# Patient Record
Sex: MALE
[De-identification: names, ages, dates, MRNs, and addresses within clinical notes are randomized per-mention and may not be internally consistent; named-entity substitution may affect disease eponyms.]

---

## 2022-10-18 ENCOUNTER — NON-APPOINTMENT (OUTPATIENT)
Age: 72
End: 2022-10-18

## 2023-02-13 PROBLEM — Z00.00 ENCOUNTER FOR PREVENTIVE HEALTH EXAMINATION: Status: ACTIVE | Noted: 2023-02-13

## 2023-02-14 ENCOUNTER — APPOINTMENT (OUTPATIENT)
Dept: ORTHOPEDIC SURGERY | Facility: CLINIC | Age: 73
End: 2023-02-14

## 2023-04-28 ENCOUNTER — APPOINTMENT (OUTPATIENT)
Dept: CARDIOLOGY | Facility: CLINIC | Age: 73
End: 2023-04-28
Payer: MEDICAID

## 2023-04-28 ENCOUNTER — NON-APPOINTMENT (OUTPATIENT)
Age: 73
End: 2023-04-28

## 2023-04-28 VITALS
RESPIRATION RATE: 16 BRPM | HEART RATE: 81 BPM | DIASTOLIC BLOOD PRESSURE: 52 MMHG | BODY MASS INDEX: 22.66 KG/M2 | WEIGHT: 141 LBS | OXYGEN SATURATION: 94 % | HEIGHT: 66 IN | SYSTOLIC BLOOD PRESSURE: 98 MMHG | TEMPERATURE: 98.9 F

## 2023-04-28 VITALS — DIASTOLIC BLOOD PRESSURE: 54 MMHG | SYSTOLIC BLOOD PRESSURE: 98 MMHG

## 2023-04-28 DIAGNOSIS — Z83.3 FAMILY HISTORY OF DIABETES MELLITUS: ICD-10-CM

## 2023-04-28 DIAGNOSIS — Z78.9 OTHER SPECIFIED HEALTH STATUS: ICD-10-CM

## 2023-04-28 DIAGNOSIS — Z82.49 FAMILY HISTORY OF ISCHEMIC HEART DISEASE AND OTHER DISEASES OF THE CIRCULATORY SYSTEM: ICD-10-CM

## 2023-04-28 DIAGNOSIS — E11.9 TYPE 2 DIABETES MELLITUS W/OUT COMPLICATIONS: ICD-10-CM

## 2023-04-28 DIAGNOSIS — Z87.828 PERSONAL HISTORY OF OTHER (HEALED) PHYSICAL INJURY AND TRAUMA: ICD-10-CM

## 2023-04-28 PROCEDURE — 99204 OFFICE O/P NEW MOD 45 MIN: CPT | Mod: 25

## 2023-04-28 PROCEDURE — 93000 ELECTROCARDIOGRAM COMPLETE: CPT

## 2023-04-28 RX ORDER — DAPAGLIFLOZIN 5 MG/1
5 TABLET, FILM COATED ORAL DAILY
Qty: 30 | Refills: 2 | Status: ACTIVE | COMMUNITY
Start: 2023-04-28

## 2023-04-28 NOTE — PHYSICAL EXAM
[Well Developed] : well developed [No Acute Distress] : no acute distress [Well Nourished] : well nourished [Normal Conjunctiva] : normal conjunctiva [Normal Venous Pressure] : normal venous pressure [No Carotid Bruit] : no carotid bruit [Normal S1, S2] : normal S1, S2 [No Murmur] : no murmur [No Rub] : no rub [No Gallop] : no gallop [Clear Lung Fields] : clear lung fields [Good Air Entry] : good air entry [No Respiratory Distress] : no respiratory distress  [Non Tender] : non-tender [Soft] : abdomen soft [No Masses/organomegaly] : no masses/organomegaly [Normal Bowel Sounds] : normal bowel sounds [Normal Gait] : normal gait [No Edema] : no edema [No Cyanosis] : no cyanosis [No Clubbing] : no clubbing [No Varicosities] : no varicosities [No Rash] : no rash [No Skin Lesions] : no skin lesions [Moves all extremities] : moves all extremities [No Focal Deficits] : no focal deficits [Normal Speech] : normal speech [Alert and Oriented] : alert and oriented [Normal memory] : normal memory

## 2023-04-28 NOTE — DISCUSSION/SUMMARY
[FreeTextEntry1] : 73M Mandarin-speaking h/o thalassemia -trait, HTN, DM2, intermittent L-sided chest discomfort, presents for cardiology evaluation. \par \par Nonexertional chest discomfort and EKG no ischemic abnormalities, overall risk factors are controlled, but given intermediate risk for CAD will obtain cardiac CTA and Echocardiogram. \par \par \par 1. Chest discomfort- await Echo and CCTA, premed with metoprolol 50mg x2 doses, recent Cr. normal, advised to hold losartan and metformin prior to the CCTA. \par \par 2. HTN- BP at goal on losartan 50mg, may need dose reduction to 25mg if SBP <100 persistently. \par \par 3. DM2- on oral agents recent A1c at goal, not on statin with lipid panel at goal, if CCTA with calcifications to add low dose atorvastatin 20mg. \par \par \par Follow up pending result of the above tests.  [EKG obtained to assist in diagnosis and management of assessed problem(s)] : EKG obtained to assist in diagnosis and management of assessed problem(s)

## 2023-04-28 NOTE — HISTORY OF PRESENT ILLNESS
[FreeTextEntry1] : 73M Mandarin-speaking h/o thalassemia -trait, HTN, DM2, intermittent L-sided chest discomfort, presents for cardiology evaluation. \par \par Patient presents with his wife/daughter for the visit. 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 Recollect had prior exercise stress test >20yrs ago. \par \par \par PCP Dr. Pollo Wilder [phone]; fax [phone]\par Recent lab: A1c 6.6%, LDL 70, Cr. 0.67\par \par Nonsmoker, no alcohol use\par No CAD or stroke in family\par Lives in [place], ordinally from [facility] \par

## 2023-05-02 ENCOUNTER — TRANSCRIPTION ENCOUNTER (OUTPATIENT)
Age: 73
End: 2023-05-02

## 2023-05-03 ENCOUNTER — APPOINTMENT (OUTPATIENT)
Dept: UROLOGY | Facility: CLINIC | Age: 73
End: 2023-05-03
Payer: MEDICAID

## 2023-05-03 VITALS
SYSTOLIC BLOOD PRESSURE: 136 MMHG | DIASTOLIC BLOOD PRESSURE: 75 MMHG | RESPIRATION RATE: 16 BRPM | HEIGHT: 66 IN | WEIGHT: 142 LBS | TEMPERATURE: 98.2 F | HEART RATE: 77 BPM | BODY MASS INDEX: 22.82 KG/M2

## 2023-05-03 DIAGNOSIS — N40.0 BENIGN PROSTATIC HYPERPLASIA WITHOUT LOWER URINARY TRACT SYMPMS: ICD-10-CM

## 2023-05-03 PROCEDURE — 51798 US URINE CAPACITY MEASURE: CPT

## 2023-05-03 PROCEDURE — 99204 OFFICE O/P NEW MOD 45 MIN: CPT

## 2023-05-03 RX ORDER — TAMSULOSIN HYDROCHLORIDE 0.4 MG/1
0.4 CAPSULE ORAL
Qty: 90 | Refills: 3 | Status: DISCONTINUED | COMMUNITY
Start: 2023-05-03 | End: 2023-05-03

## 2023-05-04 LAB
ANION GAP SERPL CALC-SCNC: 12 MMOL/L
APPEARANCE: CLEAR
BACTERIA: NEGATIVE /HPF
BILIRUBIN URINE: NEGATIVE
BLOOD URINE: NEGATIVE
BUN SERPL-MCNC: 31 MG/DL
CALCIUM SERPL-MCNC: 9.9 MG/DL
CAST: 0 /LPF
CHLORIDE SERPL-SCNC: 102 MMOL/L
CO2 SERPL-SCNC: 26 MMOL/L
COLOR: YELLOW
CREAT SERPL-MCNC: 0.87 MG/DL
EGFR: 92 ML/MIN/1.73M2
EPITHELIAL CELLS: 0 /HPF
GLUCOSE QUALITATIVE U: >=1000 MG/DL
GLUCOSE SERPL-MCNC: 152 MG/DL
KETONES URINE: NEGATIVE MG/DL
LEUKOCYTE ESTERASE URINE: NEGATIVE
MICROSCOPIC-UA: NORMAL
NITRITE URINE: NEGATIVE
PH URINE: 5.5
POTASSIUM SERPL-SCNC: 5.2 MMOL/L
PROTEIN URINE: NEGATIVE MG/DL
RED BLOOD CELLS URINE: 0 /HPF
SODIUM SERPL-SCNC: 139 MMOL/L
SPECIFIC GRAVITY URINE: 1.03
UROBILINOGEN URINE: 0.2 MG/DL
WHITE BLOOD CELLS URINE: 0 /HPF

## 2023-05-04 NOTE — LETTER BODY
[FreeTextEntry1] : Renae Wilder MD\par [address]\par [address]\par [place] [address] \par [phone]\par [phone]\par \par Dear Dr. Wilder,\par \par Reason for Visit: BPH.\par \par This is a 73 year old gentleman with symptoms of BPH. Patient is here today for evaluation. Patient reports he has weak uroflow, frequency, and hesitancy. He denies any hematuria or urinary incontinence. His symptoms are aggravated by hydration. He denies any alleviating factors. He has not tried any medical therapy previously. He reports no pain. All other review of systems are negative. Past medical history, family history, and social history were inquired and were noncontributory to patient current condition. Medications and allergies were reviewed. He recently return from [place] and is accompanied his wife. \par \par On examination, the patient is a well appearing gentleman in no acute distress. He is alert and oriented follows commands. He has normal mood and affect. He is normocephalic. Neck is supple. Oral no thrush Respirations are unlabored. His abdomen is soft and nontender. Bladder is nonpalpable. No CVA tenderness. Neurologically he is grossly intact. No peripheral edema. Skin without gross abnormality. He has normal male external genitalia. Normal meatus. Bilateral testes are descended intrascrotally and normal to palpation. On rectal examination, there is normal sphincter tone. The prostate is clinically benign without focal induration or nodularity.\par \par Post-void residual on bladder scan today was 138 cc.\par \par ASSESSMENT: BPH\par \par I counseled the patient on the various etiology of his symptoms. I discussed the natural history of BPH and the treatment options available. I discussed the options of conservative management with fluid in dietary restrictions, herbal therapy, medical therapy, and minimally invasive procedures.  Risk and benefits were discussed. I answered his questions. I recommended he try Flomax. I discussed the potential side effects of the medication. I counseled the patient on its use and side effects. If the patient develops any side effects, the patient will discontinue the medication and contact me. Risks and alternatives were discussed. I answered the patient questions. The patient will follow-up as directed and will contact me with any questions or concerns. \par \par Plan: Trial of Flomax. Followup 1 month. PSA 
no hemoptysis/no pleuritic chest pain/no cough/no wheezing/no dyspnea

## 2023-05-05 LAB
BACTERIA UR CULT: NORMAL
PSA FREE FLD-MCNC: 23 %
PSA FREE SERPL-MCNC: 0.69 NG/ML
PSA SERPL-MCNC: 2.95 NG/ML

## 2023-05-18 ENCOUNTER — APPOINTMENT (OUTPATIENT)
Dept: CARDIOLOGY | Facility: CLINIC | Age: 73
End: 2023-05-18
Payer: MEDICAID

## 2023-05-18 PROCEDURE — 93306 TTE W/DOPPLER COMPLETE: CPT

## 2023-05-24 ENCOUNTER — APPOINTMENT (OUTPATIENT)
Dept: CT IMAGING | Facility: CLINIC | Age: 73
End: 2023-05-24
Payer: MEDICAID

## 2023-05-24 ENCOUNTER — OUTPATIENT (OUTPATIENT)
Dept: OUTPATIENT SERVICES | Facility: HOSPITAL | Age: 73
LOS: 1 days | End: 2023-05-24
Payer: MEDICAID

## 2023-05-24 DIAGNOSIS — I10 ESSENTIAL (PRIMARY) HYPERTENSION: ICD-10-CM

## 2023-05-24 DIAGNOSIS — Z00.00 ENCOUNTER FOR GENERAL ADULT MEDICAL EXAMINATION WITHOUT ABNORMAL FINDINGS: ICD-10-CM

## 2023-05-24 DIAGNOSIS — R07.89 OTHER CHEST PAIN: ICD-10-CM

## 2023-05-24 PROCEDURE — 75574 CT ANGIO HRT W/3D IMAGE: CPT | Mod: 26

## 2023-05-26 ENCOUNTER — RX CHANGE (OUTPATIENT)
Age: 73
End: 2023-05-26

## 2023-05-26 DIAGNOSIS — R93.1 ABNORMAL FINDINGS ON DIAGNOSTIC IMAGING OF HEART AND CORONARY CIRCULATION: ICD-10-CM

## 2023-05-29 ENCOUNTER — RESULT REVIEW (OUTPATIENT)
Age: 73
End: 2023-05-29

## 2023-05-30 PROCEDURE — 0502T: CPT

## 2023-05-30 PROCEDURE — 0503T: CPT

## 2023-05-30 PROCEDURE — 75574 CT ANGIO HRT W/3D IMAGE: CPT

## 2023-05-30 PROCEDURE — 0504T: CPT

## 2023-06-05 ENCOUNTER — APPOINTMENT (OUTPATIENT)
Dept: UROLOGY | Facility: CLINIC | Age: 73
End: 2023-06-05
Payer: MEDICAID

## 2023-06-05 PROCEDURE — 51798 US URINE CAPACITY MEASURE: CPT

## 2023-06-05 PROCEDURE — 99214 OFFICE O/P EST MOD 30 MIN: CPT

## 2023-06-05 NOTE — LETTER BODY
[FreeTextEntry1] : Renae Wilder MD\par [address]\par [address]\par [place] [address] \par [phone]\par [phone]\par \par Dear Dr. Wilder,\par \par \par Reason for visit: BPH. \par \par This is a 73 year-old gentleman with chronic BPH. Patient returns today for followup. Patient continues to take Flomax daily. Patient reports taking the medication regularly without any side effects or difficulties with the medication. Patient reports progression in his urinary symptoms with weak flow and frequency. Patient denies any urinary retention or hematuria or changes in health. Patient has no pain. The past medical history and family history and social history are unchanged. All other review of systems are negative. Patient denies any changes in medications. Medication list was reconciled.\par \par On examination, the patient is a healthy-appearing gentleman in no acute distress. He is alert and oriented follows commands. He has normal mood and affect. He is normocephalic. Oral no thrush. Neck is supple. Respirations are unlabored. His abdomen is soft and nontender. Liver is nonpalpable. Bladder is nonpalpable. No CVA tenderness. Neurologically he is grossly intact. No peripheral edema. Skin without gross abnormality. \par \par PVR today was 40 cc.  This is improved from previous evaluation.\par \par His recent PSA was unremarkable.\par \par Assessment: BPH, with progressive urinary symptoms despite medical therapy.\par \par I counseled the patient.  I renewed his prescription for Flomax and Proscar today. I encouraged him to increase Flomax to twice daily.  If he has persistent symptoms, I recommended he proceed with cystoscopy and urodynamic testing to evaluate detrusor function and bladder outlet obstruction.  I counseled the patient regarding the procedure. The risks and benefits were discussed. Alternatives were given. I answered the patient questions. The patient will take the necessary preparations for the procedure. The patient will follow-up as directed and will contact me with any questions or concerns.\par \par Plan: Flomax twice daily.  Consider cystoscopy.  Urodynamic testing.

## 2023-06-06 ENCOUNTER — NON-APPOINTMENT (OUTPATIENT)
Age: 73
End: 2023-06-06

## 2023-07-10 ENCOUNTER — APPOINTMENT (OUTPATIENT)
Dept: GASTROENTEROLOGY | Facility: CLINIC | Age: 73
End: 2023-07-10
Payer: MEDICAID

## 2023-07-10 ENCOUNTER — APPOINTMENT (OUTPATIENT)
Dept: UROLOGY | Facility: CLINIC | Age: 73
End: 2023-07-10
Payer: MEDICAID

## 2023-07-10 VITALS
HEIGHT: 66 IN | BODY MASS INDEX: 22.5 KG/M2 | DIASTOLIC BLOOD PRESSURE: 62 MMHG | SYSTOLIC BLOOD PRESSURE: 120 MMHG | TEMPERATURE: 98.7 F | OXYGEN SATURATION: 97 % | HEART RATE: 78 BPM | WEIGHT: 140 LBS

## 2023-07-10 DIAGNOSIS — K21.9 GASTRO-ESOPHAGEAL REFLUX DISEASE W/OUT ESOPHAGITIS: ICD-10-CM

## 2023-07-10 DIAGNOSIS — Z80.0 FAMILY HISTORY OF MALIGNANT NEOPLASM OF DIGESTIVE ORGANS: ICD-10-CM

## 2023-07-10 DIAGNOSIS — Z91.89 OTHER SPECIFIED PERSONAL RISK FACTORS, NOT ELSEWHERE CLASSIFIED: ICD-10-CM

## 2023-07-10 PROCEDURE — 99213 OFFICE O/P EST LOW 20 MIN: CPT

## 2023-07-10 PROCEDURE — 99204 OFFICE O/P NEW MOD 45 MIN: CPT

## 2023-07-10 NOTE — REASON FOR VISIT
[Initial Evaluation] : an initial evaluation [Spouse] : spouse [Family Member] : family member [FreeTextEntry1] : Colon cancer screening, new coughing/reflux

## 2023-07-10 NOTE — PHYSICAL EXAM
[Alert] : alert [Normal Voice/Communication] : normal voice/communication [Healthy Appearing] : healthy appearing [Sclera] : the sclera and conjunctiva were normal [Hearing Threshold Finger Rub Not [place]] : hearing was normal [No Respiratory Distress] : no respiratory distress [No Acc Muscle Use] : no accessory muscle use [Respiration, Rhythm And Depth] : normal respiratory rhythm and effort [Heart Rate And Rhythm] : heart rate was normal and rhythm regular [Normal S1, S2] : normal S1 and S2 [Murmurs] : no murmurs [Bowel Sounds] : normal bowel sounds [Abdomen Tenderness] : non-tender [No Masses] : no abdominal mass palpated [Abdomen Soft] : soft [No Spinal Tenderness] : no spinal tenderness [Abnormal Walk] : normal gait [Normal Color / Pigmentation] : normal skin color and pigmentation [No Focal Deficits] : no focal deficits [Oriented To Time, Place, And Person] : oriented to person, place, and time

## 2023-07-10 NOTE — HISTORY OF PRESENT ILLNESS
[FreeTextEntry1] : Patient interviewed in native Mandarin Chinese. \par \par This is a 73 year old male with thalassemia trait, HTN, DM2, who presents for evaluation of high risk colon cancer screening, new reflux.\par \par The patient has seen cardiology, and is pending cardiac cath for abnormal coronary later this month. He's been having coughing,and wa given omeprazole which improved the coughing. The coughing is only new and he attributes it to reflux, he never had reflux before. He only started the omperazole in the last 1-2 weeks. No dysphagia. No abdominal pain. No nausea/vomiting. Stools usually are formed, no melena/hematochezia. No abnormal weight loss. His father had stomach and colon cancer at in his 70's. He has never had endoscopic evaluation or colon cancer screening before.\par \par TTE 5/18/23:\par CONCLUSIONS:\par \par 1. The left ventricular systolic function is normal with an ejection fraction of 63 % by Calabrese's method of disks with an ejection fraction visually estimated at 60 to 65 %.\par 2. Impaired relaxation pattern.\par 3. Normal right ventricular cavity size and normal systolic function, estimated PASP 22 mmHg.\par 4. Redundant and thickened hypermobile septal leaflet of the tricuspid valve.\par 5. The aortic root at sinuses of Valsalva is moderately dilated 4.6 cm index to BSA 2.7 cm/m2. Ascending aorta 3.9 cm.\par 6. No pericardial effusion seen.\par 7. No prior echocardiogram is available for comparison.\par \par \par 5/3/23: Na 139, K 5.2, Cl 102, bicarb 26, BUN 31, Cr 0.87, glucose 152, Ca 9.9

## 2023-07-10 NOTE — LETTER BODY
[FreeTextEntry1] : Renae Wilder MD\par [address]\par [address]\par [place] [address] \par [phone]\par [phone]\par \par Dear Dr. Wilder,\par  \par Reason for visit: BPH. \par \par This is a 73 year year-old gentleman with chronic BPH. Patient returns today for followup. Patient continues to take Flomax twice daily. Patient reports taking the medication regularly without any side effects or difficulties with the medication. Patient reports improvement in his urinary flow. Patient denies any urinary retention or hematuria or changes in health. Patient has no pain. The past medical history and family history and social history are unchanged. All other review of systems are negative. Patient denies any changes in medications. Medication list was reconciled.\par \par On examination, the patient is a healthy-appearing gentleman in no acute distress. He is alert and oriented follows commands. He has normal mood and affect. He is normocephalic. Oral no thrush. Neck is supple. Respirations are unlabored. His abdomen is soft and nontender. Liver is nonpalpable. Bladder is nonpalpable. No CVA tenderness. Neurologically he is grossly intact. No peripheral edema. Skin without gross abnormality.  On rectal examination, there is normal sphincter tone. The prostate is clinically benign without focal induration or nodularity.\par \par PSA 2.95.\par \par Assessment: BPH, symptoms improved with Flomax twice daily.\par \par I counseled the patient.  I renewed his prescription for Flomax today.  Patient obtain PSA today.  I encouraged him to continue medication. Risks and alternatives were discussed. I answered the patient questions. The patient will follow-up as directed and will contact me with any questions or concerns.  Thank you for the opportunity to participate in the care of this patient. I'll keep you updated on his  progress.\par \par Plan: Followup 1 year. Continue Flomax twice daily.  PSA.

## 2023-07-10 NOTE — ASSESSMENT
[FreeTextEntry1] : Impression:\par 1. High risk for colon cancer given 1st degree relative (father) with colon cancer\par 2. New coughing - ?new reflux\par \par Plan:\par -Given new reflux in older patient, would pursue EGD to rule out underlying lesion\par -Advised colonoscopy for screening as he's overdue; explained that in patinets with 1st degree relative with colon cancer, the screening interval is for q5 years\par -Risks and benefits of upper endoscopy and colonoscopy including but not limited to bleeding, infection, missed lesions, perforation, injury to internal organs, anesthesia/drug side effects were discussed with patient. All questions answered.  \par -However, since patient had chest pain and is pending cardiac cath, we will await cardiac workup is completed before endoscopic evaluation as he has no bleeding/urgent indications to proceed before cath; will d/w patient's cardiologist\par -Advised lifestyle modifications for reflux. Asked patient to avoid eating 3 hours before going to bed or laying down, and to elevate the head of his bed.\par -Can continue PPI for now as he is having benefit\par -Askd patient to call me back after his cardiac cath to see what is required during the cath to determine the next steps

## 2023-07-10 NOTE — CONSULT LETTER
[Dear  ___] : Dear  [unfilled], [Consult Letter:] : I had the pleasure of evaluating your patient, [unfilled]. [Please see my note below.] : Please see my note below. [Consult Closing:] : Thank you very much for allowing me to participate in the care of this patient.  If you have any questions, please do not hesitate to contact me. [Sincerely,] : Sincerely, [FreeTextEntry2] : Dr. Renae Wilder [FreeTextEntry3] : Vance Carlisle MD\par [facility]\par  of Medicine, [facility] [employer] at [facility]\par Tel: [phone]\par Fax: [phone]\par

## 2023-07-29 RX ORDER — CHLORHEXIDINE GLUCONATE 213 G/1000ML
1 SOLUTION TOPICAL ONCE
Refills: 0 | Status: DISCONTINUED | OUTPATIENT
Start: 2023-07-31 | End: 2023-08-01

## 2023-07-29 NOTE — H&P PST ADULT - HISTORY OF PRESENT ILLNESS
Narrative:   72 yo male h/o thalassemia-trait, HTN, DM2 with c/o intermittent left sided chest discomfort. Reports chest discomfort as pressure and been going on for a few years, but worse lately occuring randomly and no associated symptoms. He recently had an abnormal cardiac CTA, with multivessal CAD with severe coronary calcifications in the LAD and severe stenosis of OM, given symptoms of chest pain presents for elective LHC to R/O CAD.       Symptoms:        Angina (Class):        Ischemic Symptoms:     Heart Failure:        Systolic/Diastolic/Combined:        NYHA Class (within 2 weeks):     Assessment of LVEF:       EF:        Assessed by:        Date:     Prior Cardiac Interventions: N/A       PCI's:        CABG:     Noninvasive Testing:   Stress Test: Date:        Protocol:        Duration of Exercise:        Symptoms:        EKG Changes:        DTS:        Myocardial Imaging:        Risk Assessment:     Echo:     Antianginal Therapies:        Beta Blockers:         Calcium Channel Blockers:        Long Acting Nitrates:        Ranexa:     Associated Risk Factors:        Cerebrovascular Disease: N/A       Chronic Lung Disease: N/A       Peripheral Arterial Disease: N/A       Chronic Kidney Disease (if yes, what is GFR): N/A       Uncontrolled Diabetes (if yes, what is HgbA1C or FBS): N/A       Poorly Controlled Hypertension (if yes, what is SBP): N/A       Morbid Obesity (if yes, what is BMI): N/A       History of Recent Ventricular Arrhythmia: N/A       Inability to Ambulate Safely: N/A       Need for Therapeutic Anticoagulation: N/A       Antiplatelet or Contrast Allergy: N/A Narrative:   74 yo male h/o thalassemia-trait, HTN, DM2 with c/o intermittent left sided chest discomfort. Reports chest discomfort as pressure and been going on for a few years, but worse lately occuring randomly and no associated symptoms. He recently had an abnormal cardiac CTA, with multivessal CAD with severe coronary calcifications in the LAD and severe stenosis of OM, given symptoms of chest pain presents for elective LHC to R/O CAD.       Symptoms:        Angina (Class):        Ischemic Symptoms:     Heart Failure:        Systolic/Diastolic/Combined:        NYHA Class (within 2 weeks):     Assessment of LVEF:       EF: 60-65%       Assessed by: TTE       Date: 5/18/2023    Prior Cardiac Interventions: N/A       PCI's:        CABG:     Noninvasive Testing:   Cardiac CTA: 5/2023: Severe stenosis of proximal OM1 branch. Moderate stenosis of the proximal right coronary and proximal circumflex arteries.     Echo: 5/2023: EF 60-65% Redundant and thickened hypermobile septal leaflet of the tricuspid valve. The aortic root at sinuses of valsalva is moderately dilated 4.6cm index to BSA 2.7cm. Ascending aorta 3.9cm    Antianginal Therapies:        Beta Blockers:  Metoprolol 50mg        Calcium Channel Blockers:        Long Acting Nitrates:        Ranexa:     Associated Risk Factors:        Cerebrovascular Disease: N/A       Chronic Lung Disease: N/A       Peripheral Arterial Disease: N/A       Chronic Kidney Disease (if yes, what is GFR): N/A       Uncontrolled Diabetes (if yes, what is HgbA1C or FBS): N/A       Poorly Controlled Hypertension (if yes, what is SBP): N/A       Morbid Obesity (if yes, what is BMI): N/A       History of Recent Ventricular Arrhythmia: N/A       Inability to Ambulate Safely: N/A       Need for Therapeutic Anticoagulation: N/A       Antiplatelet or Contrast Allergy: N/A 72 yo male h/o thalassemia-trait, HTN, DM2 with c/o intermittent left sided chest discomfort. Reports chest discomfort as pressure and been going on for a few years, but worse lately occuring randomly and no associated symptoms. He recently had an abnormal cardiac CTA, with multivessal CAD with severe coronary calcifications in the LAD and severe stenosis of OM, given symptoms of chest pain presents for elective LHC to R/O CAD.       Symptoms:        Angina (Class):        Ischemic Symptoms:     Heart Failure:        Systolic/Diastolic/Combined:        NYHA Class (within 2 weeks):     Assessment of LVEF:       EF: 60-65%       Assessed by: TTE       Date: 5/18/2023    Prior Cardiac Interventions: N/A       PCI's: N/A       CABG: N/A    Noninvasive Testing:   Cardiac CTA Calcium Score:   CORONARY: Right coronary artery dominance. No evidence for anomalous coronary arteries. (Total: 3544)  LEFT MAIN: Trifurcation into LAD, ramus intermedius, and LCx. Minimal (less than 15%) stenosis of the proximal and distal left main artery due to calcified plaque. (Calcium Score: 29, CT-FFR: )  RAMUS INTERMEDIUS: Small patent vessel.  LEFT ANTERIOR DESCENDING: There is one diagonal branch. Distal vessel wraps around apex. (Calcium Score: 535, CT-FFR: 0.88)  ·	Proximal: Mild stenosis due to calcified and noncalcified plaque.  ·	Mid: Mild stenosis due to calcified and noncalcified plaque. Short segment shallow myocardial bridging.  ·	Distal: Minimal stenosis due to noncalcified plaque.  ·	First diagonal: Large vessel with extensive dense calcification, which precludes evaluation for stenosis severity. Severe stenosis is not excluded. The vessel is patent distally (CT-FFR: 0.54).  LEFT CIRCUMFLEX: There are two obtuse marginal branches. (Calcium Score: 676, CT-FFR: 0.94 just distal to the OM --->0.8 ---> 0.57)  ·	Proximal: Moderate stenosis due to extensive calcified and noncalcified plaque.   ·	Mid/Distal: Extensive dense calcification, precluding evaluation for stenosis severity. Severe stenosis is not excluded.  ·	First obtuse marginal: Proximal severe stenosis due to calcified and noncalcified plaque. Vessels patent distally (CT-FFR: 0.89)  ·	Second obtuse marginal: Small patent vessel.  RIGHT CORONARY ARTERY: Gives off PDA, PLV branches. (Calcium Score: 2304, CT-FFR: Proximal: 0.97, Mid: 0.82, Distal: 0.88)  ·	Proximal: Moderate stenosis due to extensive, predominantly calcified plaque  ·	Mid: Mild stenosis due to predominantly calcified plaque.  ·	Distal: Moderate stenosis due to extensive calcified and noncalcified plaque.  ·	Posterior descending: Patent vessel; scattered areas of calcified plaque without significant stenosis.  ·	Posterior lateral: Patent vessel; scattered areas of calcified plaque without significant stenosis.    Echo:     Echo: 5/2023: EF 60-65% Redundant and thickened hypermobile septal leaflet of the tricuspid valve. The aortic root at sinuses of valsalva is moderately dilated 4.6cm index to BSA 2.7cm. Ascending aorta 3.9cm    Antianginal Therapies:        Beta Blockers:  Metoprolol 50mg        Calcium Channel Blockers:        Long Acting Nitrates:        Ranexa:     Associated Risk Factors:        Cerebrovascular Disease: N/A       Chronic Lung Disease: N/A       Peripheral Arterial Disease: N/A       Chronic Kidney Disease (if yes, what is GFR): N/A       Uncontrolled Diabetes (if yes, what is HgbA1C or FBS): N/A       Poorly Controlled Hypertension (if yes, what is SBP): N/A       Morbid Obesity (if yes, what is BMI): N/A       History of Recent Ventricular Arrhythmia: N/A       Inability to Ambulate Safely: N/A       Need for Therapeutic Anticoagulation: N/A       Antiplatelet or Contrast Allergy: N/A

## 2023-07-29 NOTE — H&P PST ADULT - ASSESSMENT
72 yo male with c/o chest pain for LHC    Plan/Recommendations:   -plan for LHC  -preferred access: RRA vs RFA  -patient seen and examined  -confirmed appropriate NPO duration  -ECG and Labs reviewed  -Aspirin 81mg po pre-cath  -Plavix 75 mg given  -NS 250mL IV bolus pre-cath  -procedure discussed with patient; risks and benefits explained, questions answered  -consent obtained by attending IC   72 yo male with c/o chest pain for LHC    Plan/Recommendations:   -plan for LHC  -preferred access: RRA vs RFA  -patient seen and examined  -confirmed appropriate NPO duration  -ECG and Labs reviewed  -Aspirin 81mg po pre-cath  -NS 250mL IV bolus pre-cath  -procedure discussed with patient; risks and benefits explained, questions answered  -consent obtained by attending IC

## 2023-07-29 NOTE — H&P PST ADULT - OTHER CARE PROVIDERS
Dr. Thompson Drew Thompson ([facility] at [place], [address], [address], [place] [address], Phone: [phone], Fax: [phone])

## 2023-07-29 NOTE — H&P PST ADULT - PRIMARY CARE PROVIDER
Dr. Pollo Wilder Pollo Wilder Pollo Wilder ([address], [place] [address], Phone: [phone], Fax:

## 2023-07-31 ENCOUNTER — INPATIENT (INPATIENT)
Facility: HOSPITAL | Age: 73
LOS: 0 days | Discharge: HOME | DRG: 247 | End: 2023-08-01
Attending: INTERNAL MEDICINE | Admitting: INTERNAL MEDICINE
Payer: COMMERCIAL

## 2023-07-31 ENCOUNTER — TRANSCRIPTION ENCOUNTER (OUTPATIENT)
Age: 73
End: 2023-07-31

## 2023-07-31 VITALS
WEIGHT: 143.96 LBS | OXYGEN SATURATION: 98 % | RESPIRATION RATE: 14 BRPM | SYSTOLIC BLOOD PRESSURE: 129 MMHG | TEMPERATURE: 98 F | HEIGHT: 66 IN | HEART RATE: 67 BPM | DIASTOLIC BLOOD PRESSURE: 79 MMHG

## 2023-07-31 DIAGNOSIS — I10 ESSENTIAL (PRIMARY) HYPERTENSION: ICD-10-CM

## 2023-07-31 LAB
A1C WITH ESTIMATED AVERAGE GLUCOSE RESULT: 7.4 % — HIGH (ref 4–5.6)
AGGLUTINATION: PRESENT — SIGNIFICANT CHANGE UP
ANION GAP SERPL CALC-SCNC: 9 MMOL/L — SIGNIFICANT CHANGE UP (ref 5–17)
ANISOCYTOSIS BLD QL: SLIGHT — SIGNIFICANT CHANGE UP
BASOPHILS # BLD AUTO: 0 K/UL — SIGNIFICANT CHANGE UP (ref 0–0.2)
BASOPHILS NFR BLD AUTO: 0 % — SIGNIFICANT CHANGE UP (ref 0–2)
BUN SERPL-MCNC: 22.5 MG/DL — HIGH (ref 8–20)
BURR CELLS BLD QL SMEAR: PRESENT — SIGNIFICANT CHANGE UP
CALCIUM SERPL-MCNC: 8.9 MG/DL — SIGNIFICANT CHANGE UP (ref 8.4–10.5)
CHLORIDE SERPL-SCNC: 98 MMOL/L — SIGNIFICANT CHANGE UP (ref 96–108)
CHOLEST SERPL-MCNC: 93 MG/DL — SIGNIFICANT CHANGE UP
CO2 SERPL-SCNC: 28 MMOL/L — SIGNIFICANT CHANGE UP (ref 22–29)
CREAT SERPL-MCNC: 0.67 MG/DL — SIGNIFICANT CHANGE UP (ref 0.5–1.3)
EGFR: 99 ML/MIN/1.73M2 — SIGNIFICANT CHANGE UP
ELLIPTOCYTES BLD QL SMEAR: SLIGHT — SIGNIFICANT CHANGE UP
EOSINOPHIL # BLD AUTO: 0.42 K/UL — SIGNIFICANT CHANGE UP (ref 0–0.5)
EOSINOPHIL NFR BLD AUTO: 6 % — SIGNIFICANT CHANGE UP (ref 0–6)
ESTIMATED AVERAGE GLUCOSE: 166 MG/DL — HIGH (ref 68–114)
GIANT PLATELETS BLD QL SMEAR: PRESENT — SIGNIFICANT CHANGE UP
GLUCOSE BLDC GLUCOMTR-MCNC: 127 MG/DL — HIGH (ref 70–99)
GLUCOSE SERPL-MCNC: 141 MG/DL — HIGH (ref 70–99)
HCT VFR BLD CALC: 48.3 % — SIGNIFICANT CHANGE UP (ref 39–50)
HDLC SERPL-MCNC: 53 MG/DL — SIGNIFICANT CHANGE UP
HGB BLD-MCNC: 15 G/DL — SIGNIFICANT CHANGE UP (ref 13–17)
LIPID PNL WITH DIRECT LDL SERPL: 23 MG/DL — SIGNIFICANT CHANGE UP
LYMPHOCYTES # BLD AUTO: 0.97 K/UL — LOW (ref 1–3.3)
LYMPHOCYTES # BLD AUTO: 13.8 % — SIGNIFICANT CHANGE UP (ref 13–44)
MAGNESIUM SERPL-MCNC: 2.3 MG/DL — SIGNIFICANT CHANGE UP (ref 1.6–2.6)
MANUAL SMEAR VERIFICATION: SIGNIFICANT CHANGE UP
MCHC RBC-ENTMCNC: 21.2 PG — LOW (ref 27–34)
MCHC RBC-ENTMCNC: 31.1 GM/DL — LOW (ref 32–36)
MCV RBC AUTO: 68.3 FL — LOW (ref 80–100)
MONOCYTES # BLD AUTO: 0.42 K/UL — SIGNIFICANT CHANGE UP (ref 0–0.9)
MONOCYTES NFR BLD AUTO: 6 % — SIGNIFICANT CHANGE UP (ref 2–14)
NEUTROPHILS # BLD AUTO: 3.71 K/UL — SIGNIFICANT CHANGE UP (ref 1.8–7.4)
NEUTROPHILS NFR BLD AUTO: 52.6 % — SIGNIFICANT CHANGE UP (ref 43–77)
NON HDL CHOLESTEROL: 40 MG/DL — SIGNIFICANT CHANGE UP
OVALOCYTES BLD QL SMEAR: SLIGHT — SIGNIFICANT CHANGE UP
PLAT MORPH BLD: ABNORMAL
PLATELET # BLD AUTO: 243 K/UL — SIGNIFICANT CHANGE UP (ref 150–400)
POIKILOCYTOSIS BLD QL AUTO: SLIGHT — SIGNIFICANT CHANGE UP
POLYCHROMASIA BLD QL SMEAR: SLIGHT — SIGNIFICANT CHANGE UP
POTASSIUM SERPL-MCNC: 3.9 MMOL/L — SIGNIFICANT CHANGE UP (ref 3.5–5.3)
POTASSIUM SERPL-SCNC: 3.9 MMOL/L — SIGNIFICANT CHANGE UP (ref 3.5–5.3)
RBC # BLD: 7.07 M/UL — HIGH (ref 4.2–5.8)
RBC # FLD: 18.6 % — HIGH (ref 10.3–14.5)
RBC BLD AUTO: NORMAL — SIGNIFICANT CHANGE UP
SMUDGE CELLS # BLD: PRESENT — SIGNIFICANT CHANGE UP
SODIUM SERPL-SCNC: 135 MMOL/L — SIGNIFICANT CHANGE UP (ref 135–145)
TRIGL SERPL-MCNC: 86 MG/DL — SIGNIFICANT CHANGE UP
VARIANT LYMPHS # BLD: 21.6 % — HIGH (ref 0–6)
WBC # BLD: 7.05 K/UL — SIGNIFICANT CHANGE UP (ref 3.8–10.5)
WBC # FLD AUTO: 7.05 K/UL — SIGNIFICANT CHANGE UP (ref 3.8–10.5)

## 2023-07-31 PROCEDURE — 93458 L HRT ARTERY/VENTRICLE ANGIO: CPT | Mod: 26,XU

## 2023-07-31 PROCEDURE — 93010 ELECTROCARDIOGRAM REPORT: CPT

## 2023-07-31 PROCEDURE — 99223 1ST HOSP IP/OBS HIGH 75: CPT | Mod: 25

## 2023-07-31 PROCEDURE — 92928 PRQ TCAT PLMT NTRAC ST 1 LES: CPT | Mod: LC

## 2023-07-31 PROCEDURE — 99152 MOD SED SAME PHYS/QHP 5/>YRS: CPT

## 2023-07-31 RX ORDER — CLOPIDOGREL BISULFATE 75 MG/1
600 TABLET, FILM COATED ORAL ONCE
Refills: 0 | Status: COMPLETED | OUTPATIENT
Start: 2023-07-31 | End: 2023-07-31

## 2023-07-31 RX ORDER — GLUCAGON INJECTION, SOLUTION 0.5 MG/.1ML
1 INJECTION, SOLUTION SUBCUTANEOUS ONCE
Refills: 0 | Status: DISCONTINUED | OUTPATIENT
Start: 2023-07-31 | End: 2023-08-01

## 2023-07-31 RX ORDER — SODIUM CHLORIDE 9 MG/ML
250 INJECTION INTRAMUSCULAR; INTRAVENOUS; SUBCUTANEOUS ONCE
Refills: 0 | Status: COMPLETED | OUTPATIENT
Start: 2023-07-31 | End: 2023-07-31

## 2023-07-31 RX ORDER — LOSARTAN POTASSIUM 100 MG/1
50 TABLET, FILM COATED ORAL DAILY
Refills: 0 | Status: DISCONTINUED | OUTPATIENT
Start: 2023-07-31 | End: 2023-08-01

## 2023-07-31 RX ORDER — DEXTROSE 50 % IN WATER 50 %
25 SYRINGE (ML) INTRAVENOUS ONCE
Refills: 0 | Status: DISCONTINUED | OUTPATIENT
Start: 2023-07-31 | End: 2023-08-01

## 2023-07-31 RX ORDER — CLOPIDOGREL BISULFATE 75 MG/1
75 TABLET, FILM COATED ORAL DAILY
Refills: 0 | Status: DISCONTINUED | OUTPATIENT
Start: 2023-08-01 | End: 2023-08-01

## 2023-07-31 RX ORDER — DEXTROSE 50 % IN WATER 50 %
15 SYRINGE (ML) INTRAVENOUS ONCE
Refills: 0 | Status: DISCONTINUED | OUTPATIENT
Start: 2023-07-31 | End: 2023-08-01

## 2023-07-31 RX ORDER — SODIUM CHLORIDE 9 MG/ML
1000 INJECTION, SOLUTION INTRAVENOUS
Refills: 0 | Status: DISCONTINUED | OUTPATIENT
Start: 2023-07-31 | End: 2023-08-01

## 2023-07-31 RX ORDER — TAMSULOSIN HYDROCHLORIDE 0.4 MG/1
0.4 CAPSULE ORAL
Refills: 0 | Status: DISCONTINUED | OUTPATIENT
Start: 2023-07-31 | End: 2023-08-01

## 2023-07-31 RX ORDER — ASPIRIN/CALCIUM CARB/MAGNESIUM 324 MG
81 TABLET ORAL DAILY
Refills: 0 | Status: DISCONTINUED | OUTPATIENT
Start: 2023-07-31 | End: 2023-08-01

## 2023-07-31 RX ORDER — DEXTROSE 50 % IN WATER 50 %
12.5 SYRINGE (ML) INTRAVENOUS ONCE
Refills: 0 | Status: DISCONTINUED | OUTPATIENT
Start: 2023-07-31 | End: 2023-08-01

## 2023-07-31 RX ORDER — ATORVASTATIN CALCIUM 80 MG/1
40 TABLET, FILM COATED ORAL AT BEDTIME
Refills: 0 | Status: DISCONTINUED | OUTPATIENT
Start: 2023-07-31 | End: 2023-08-01

## 2023-07-31 RX ORDER — INSULIN LISPRO 100/ML
VIAL (ML) SUBCUTANEOUS
Refills: 0 | Status: DISCONTINUED | OUTPATIENT
Start: 2023-07-31 | End: 2023-08-01

## 2023-07-31 RX ADMIN — CLOPIDOGREL BISULFATE 600 MILLIGRAM(S): 75 TABLET, FILM COATED ORAL at 17:58

## 2023-07-31 RX ADMIN — SODIUM CHLORIDE 250 MILLILITER(S): 9 INJECTION INTRAMUSCULAR; INTRAVENOUS; SUBCUTANEOUS at 16:07

## 2023-07-31 RX ADMIN — SODIUM CHLORIDE 250 MILLILITER(S): 9 INJECTION INTRAMUSCULAR; INTRAVENOUS; SUBCUTANEOUS at 14:07

## 2023-07-31 RX ADMIN — TAMSULOSIN HYDROCHLORIDE 0.4 MILLIGRAM(S): 0.4 CAPSULE ORAL at 17:57

## 2023-07-31 RX ADMIN — ATORVASTATIN CALCIUM 40 MILLIGRAM(S): 80 TABLET, FILM COATED ORAL at 21:30

## 2023-07-31 NOTE — DISCHARGE NOTE PROVIDER - NSDCMRMEDTOKEN_GEN_ALL_CORE_FT
aspirin 81 mg oral capsule: 1 orally 3 times a week  atorvastatin 40 mg oral tablet: 1 orally once a day  Farxiga 5 mg oral tablet: 1 orally once a day  losartan 50 mg oral tablet: 1 orally once a day  metFORMIN 1000 mg oral tablet: 1 orally once a day  tamsulosin 0.4 mg oral capsule: 1 orally 2 times a day   aspirin 81 mg oral tablet, chewable: 1 tab(s) orally once a day  atorvastatin 40 mg oral tablet: 1 orally once a day  clopidogrel 75 mg oral tablet: 1 tab(s) orally once a day  Farxiga 5 mg oral tablet: 1 orally once a day  losartan 50 mg oral tablet: 1 orally once a day  metFORMIN 1000 mg oral tablet: 1 tablet orally once a day RESTRT ON AUGUST 3, 2023  tamsulosin 0.4 mg oral capsule: 1 orally 2 times a day

## 2023-07-31 NOTE — DISCHARGE NOTE PROVIDER - PROVIDER TOKENS
PROVIDER:[TOKEN:[[id number]],SCHEDULEDAPPT:[08/04/2023],SCHEDULEDAPPTTIME:[08:30 AM],ESTABLISHEDPATIENT:[T]]

## 2023-07-31 NOTE — DISCHARGE NOTE PROVIDER - NSDCCPCAREPLAN_GEN_ALL_CORE_FT
PRINCIPAL DISCHARGE DIAGNOSIS  Diagnosis: Coronary artery disease of native artery of native heart with stable angina pectoris  Assessment and Plan of Treatment:   DAPT: The patient was loaded with Brilinta 180 mg in the CCL, will load with Plavix 600 mg tonight at 6:00PM, then start Plavix 75mg daily and Aspirin 81mg daily  Statin: Continue Lipitor 40 mg daily  Aggressive lifestyle modification and risk factor reduction.  Cardiac rehab info provided/referral and communication to cardiac rehab completed      SECONDARY DISCHARGE DIAGNOSES  Diagnosis: Hyperlipidemia, unspecified hyperlipidemia type  Assessment and Plan of Treatment:   LDL Goal: NonHDL < 80, LDL < 55  Lipid Panel: At goal  Statin: Continue Lipitor 40 mg daily  Other: N/A    Diagnosis: Type 2 diabetes mellitus with other circulatory complication, without long-term current use of insul  Assessment and Plan of Treatment:   GDMT:        Diabetic diet.       FS Q AC and HS with coverage       HgbA1C: 7.4%       Basal Insulin: N/A       Nutritional Insulin: N/A       Oral Antihyperglycemics: Will restart metformin 1000 mg daily on August 3, 2023.       SGLT2i/GLP1-RA: Will continue Farxiga 5 mg daily

## 2023-07-31 NOTE — DISCHARGE NOTE PROVIDER - HOSPITAL COURSE
72 yo male h/o thalassemia-trait, HTN, DM2 with c/o intermittent left sided chest discomfort. Reports chest discomfort as pressure and been going on for a few years, but worse lately occurring randomly and no associated symptoms. He recently had an abnormal cardiac CTA, with multivessal CAD with severe coronary calcifications in the LAD and severe stenosis of OM, given symptoms of chest pain presents for elective LHC to R/O CAD. He had a LHC which showed severe CAD of the mid and distal LCX which was treated with 2 MARA's, and moderate CAD of the entire RCA and pLCX. 72 yo male h/o thalassemia-trait, HTN, DM2 with c/o intermittent left sided chest discomfort. Reports chest discomfort as pressure and been going on for a few years, but worse lately occurring randomly and no associated symptoms. He recently had an abnormal cardiac CTA, with multivessal CAD with severe coronary calcifications in the LAD and severe stenosis of OM, given symptoms of chest pain presents for elective LHC to R/O CAD. He had a LHC which showed severe CAD of the mid and distal LCX which was treated with 2 MARA's, and moderate CAD of the entire RCA and pLCX.     ROS:   General: No fatigue  HEENT: No headache, no epistaxis.  CV: No chest pain, no palpitations.  Respiratory: No SOB, no cough, no wheeze  GI: No nausea    Objective:  Vital Signs Last 24 Hrs  T(C): 36.7 (01 Aug 2023 05:57), Max: 36.7 (01 Aug 2023 05:57)  T(F): 98 (01 Aug 2023 05:57), Max: 98 (01 Aug 2023 05:57)  HR: 68 (01 Aug 2023 08:55) (64 - 70)  BP: 100/60 (01 Aug 2023 08:55) (98/56 - 121/75)  RR: 16 (01 Aug 2023 08:55) (12 - 16)  SpO2: 100% (01 Aug 2023 08:55) (95% - 100%)    Parameters below as of 01 Aug 2023 05:57  Patient On (Oxygen Delivery Method): room air    CM: SR  Neuro: A&OX3, CN 2-12 intact  HEENT: NC, AT  Lungs: CTA B/L  CV: S1, S2, no murmur, RRR  Abd: Soft  Extremity: Right wrist: no bleeding, fingers warm with good cap refil

## 2023-07-31 NOTE — DISCHARGE NOTE PROVIDER - CARE PROVIDER_API CALL
Florencia Oneal NP in Adult Health  [address] [place] [id number]  Phone: [phone]  Fax: [phone]  Established Patient  Scheduled Appointment: 08/04/2023 08:30 AM

## 2023-07-31 NOTE — PROGRESS NOTE ADULT - SUBJECTIVE AND OBJECTIVE BOX
Department of Cardiology                                                                  [facility]/[place] [address] [facility]-[address]                                                            Telephone: [phone]. Fax:[phone]                                                        INTERVENTIONAL CARDIOLOGY CATHETERIZATION NOTE     Subjective:  73y  Male who had a left heart catheterization which showed:       LM: No significant CAD       LAD: No significant CAD       LCX: No significant CAD       RCA: No significant CAD         Access: Right radial artery       Hemostasis:        Total Contrast:        Total Heparin:        Antiplatelet Given:    PAST MEDICAL & SURGICAL HISTORY:  DM type 2, not at goal      HTN (hypertension)        FAMILY HISTORY:    Home Medications:  aspirin 81 mg oral capsule: 1 orally 3 times a week (31 Jul 2023 08:37)  atorvastatin 40 mg oral tablet: 1 orally once a day (31 Jul 2023 08:37)  Farxiga 5 mg oral tablet: 1 orally once a day (31 Jul 2023 08:37)  losartan 50 mg oral tablet: 1 orally once a day (31 Jul 2023 08:37)  metFORMIN 1000 mg oral tablet: 1 orally once a day (31 Jul 2023 08:37)  tamsulosin 0.4 mg oral capsule: 1 orally 2 times a day (31 Jul 2023 08:37)    Patient is a 73y old  Male who presents with a chief complaint of   HEALTH ISSUES - PROBLEM Dx:        HPI:  74 yo male h/o thalassemia-trait, HTN, DM2 with c/o intermittent left sided chest discomfort. Reports chest discomfort as pressure and been going on for a few years, but worse lately occuring randomly and no associated symptoms. He recently had an abnormal cardiac CTA, with multivessal CAD with severe coronary calcifications in the LAD and severe stenosis of OM, given symptoms of chest pain presents for elective LHC to R/O CAD.       Symptoms:        Angina (Class):        Ischemic Symptoms:     Heart Failure:        Systolic/Diastolic/Combined:        NYHA Class (within 2 weeks):     Assessment of LVEF:       EF: 60-65%       Assessed by: TTE       Date: 5/18/2023    Prior Cardiac Interventions: N/A       PCI's: N/A       CABG: N/A    Noninvasive Testing:   Cardiac CTA Calcium Score:   CORONARY: Right coronary artery dominance. No evidence for anomalous coronary arteries. (Total: 3544)  LEFT MAIN: Trifurcation into LAD, ramus intermedius, and LCx. Minimal (less than 15%) stenosis of the proximal and distal left main artery due to calcified plaque. (Calcium Score: 29, CT-FFR: )  RAMUS INTERMEDIUS: Small patent vessel.  LEFT ANTERIOR DESCENDING: There is one diagonal branch. Distal vessel wraps around apex. (Calcium Score: 535, CT-FFR: 0.88)  ·	Proximal: Mild stenosis due to calcified and noncalcified plaque.  ·	Mid: Mild stenosis due to calcified and noncalcified plaque. Short segment shallow myocardial bridging.  ·	Distal: Minimal stenosis due to noncalcified plaque.  ·	First diagonal: Large vessel with extensive dense calcification, which precludes evaluation for stenosis severity. Severe stenosis is not excluded. The vessel is patent distally (CT-FFR: 0.54).  LEFT CIRCUMFLEX: There are two obtuse marginal branches. (Calcium Score: 676, CT-FFR: 0.94 just distal to the OM --->0.8 ---> 0.57)  ·	Proximal: Moderate stenosis due to extensive calcified and noncalcified plaque.   ·	Mid/Distal: Extensive dense calcification, precluding evaluation for stenosis severity. Severe stenosis is not excluded.  ·	First obtuse marginal: Proximal severe stenosis due to calcified and noncalcified plaque. Vessels patent distally (CT-FFR: 0.89)  ·	Second obtuse marginal: Small patent vessel.  RIGHT CORONARY ARTERY: Gives off PDA, PLV branches. (Calcium Score: 2304, CT-FFR: Proximal: 0.97, Mid: 0.82, Distal: 0.88)  ·	Proximal: Moderate stenosis due to extensive, predominantly calcified plaque  ·	Mid: Mild stenosis due to predominantly calcified plaque.  ·	Distal: Moderate stenosis due to extensive calcified and noncalcified plaque.  ·	Posterior descending: Patent vessel; scattered areas of calcified plaque without significant stenosis.  ·	Posterior lateral: Patent vessel; scattered areas of calcified plaque without significant stenosis.    Echo:     Echo: 5/2023: EF 60-65% Redundant and thickened hypermobile septal leaflet of the tricuspid valve. The aortic root at sinuses of valsalva is moderately dilated 4.6cm index to BSA 2.7cm. Ascending aorta 3.9cm    Antianginal Therapies:        Beta Blockers:  Metoprolol 50mg        Calcium Channel Blockers:        Long Acting Nitrates:        Ranexa:     Associated Risk Factors:        Cerebrovascular Disease: N/A       Chronic Lung Disease: N/A       Peripheral Arterial Disease: N/A       Chronic Kidney Disease (if yes, what is GFR): N/A       Uncontrolled Diabetes (if yes, what is HgbA1C or FBS): N/A       Poorly Controlled Hypertension (if yes, what is SBP): N/A       Morbid Obesity (if yes, what is BMI): N/A       History of Recent Ventricular Arrhythmia: N/A       Inability to Ambulate Safely: N/A       Need for Therapeutic Anticoagulation: N/A       Antiplatelet or Contrast Allergy: N/A (29 Jul 2023 13:32)    General: No fatigue, no fevers/chills  Respiratory: No dyspnea, no cough, no wheeze  CV: No chest pain, no palpitations  Abd: No nausea  Neuro: No headache, no dizziness  Allergy Status Unknown      Objective:  Vital Signs Last 24 Hrs  T(C): 36.7 (31 Jul 2023 08:31), Max: 36.7 (31 Jul 2023 08:31)  T(F): 98 (31 Jul 2023 08:31), Max: 98 (31 Jul 2023 08:31)  HR: 69 (31 Jul 2023 12:55) (64 - 70)  BP: 110/69 (31 Jul 2023 12:55) (98/56 - 129/79)  BP(mean): 99 (31 Jul 2023 08:31) (99 - 99)  RR: 13 (31 Jul 2023 12:55) (13 - 16)  SpO2: 100% (31 Jul 2023 12:25) (95% - 100%)    Parameters below as of 31 Jul 2023 12:55  Patient On (Oxygen Delivery Method): room air        CM: SR  Neuro: A&OX3, CN 2-12 intact  HEENT: NC, AT  Lungs: CTA B/L  CV: S1, S2, no murmur, RRR  Abd: Soft  Extremity: Right radial band: no bleeding, fingers warm with good cap refil    EKG: NSR                          15.0   7.05  )-----------( 243      ( 31 Jul 2023 08:25 )             48.3     07-31    135  |  98    |  22.5  ----------------------------<  141  3.9   |  28.0  |  0.67    Ca    8.9      31 Jul 2023 08:25  Mg     2.3     07-31    Lipids       Total Cholesterol: 93       Triglycerides: 86       HDL: 53       Non-HDL: 40       LDL: 23    HgbA1C: 23                                                                            Department of Cardiology                                                                  [facility]/[place] [address]  [place]                                                            Telephone: [phone]. Fax:[phone]                                                        INTERVENTIONAL CARDIOLOGY CATHETERIZATION NOTE     Subjective:  73y  Male who had a left heart catheterization which showed:  Coronary Angiography   ·	The coronary circulation is right dominant.    LM   ·	Left main artery: Angiography shows minor irregularities.    LAD   ·	Left anterior descending artery: LAD- mild diffuse disease. D1- 80-90% long area of ostial/proximal/mid disease. .  CX   Circumflex: Proximal 30-40%, Mid 80% (treated with SYNERGY XD 2.5 X 28MM MARA), Distal 75%. Heavily calcified disease (treated with SYNERGY XD 2.5 X 16MM MARA).  RCA   ·	Right coronary artery: Heavily calcified vessel. Diffuse 20-30% proximal, mid 40-50%, Distal 30-40%. .  Left Heart Cath   ·	LHC performed: Aortic valve crossed and left ventricular pressures were obtained. Hemodynamic assessment demonstrates mildly elevated LVEDP.         Access: Right radial artery       Hemostasis: Radial band       Total Contrast: 112 mL Omnipaque       Total Heparin: 7,000 units       Antiplatelet Given: Brilinta 180 mg    PAST MEDICAL & SURGICAL HISTORY:  DM type 2, not at goal  HTN (hypertension)    Home Medications:  aspirin 81 mg oral capsule: 1 orally 3 times a week (31 Jul 2023 08:37)  atorvastatin 40 mg oral tablet: 1 orally once a day (31 Jul 2023 08:37)  Farxiga 5 mg oral tablet: 1 orally once a day (31 Jul 2023 08:37)  losartan 50 mg oral tablet: 1 orally once a day (31 Jul 2023 08:37)  metFORMIN 1000 mg oral tablet: 1 orally once a day (31 Jul 2023 08:37)  tamsulosin 0.4 mg oral capsule: 1 orally 2 times a day (31 Jul 2023 08:37)    HPI: 72 yo male h/o thalassemia-trait, HTN, DM2 with c/o intermittent left sided chest discomfort. Reports chest discomfort as pressure and been going on for a few years, but worse lately occuring randomly and no associated symptoms. He recently had an abnormal cardiac CTA, with multivessal CAD with severe coronary calcifications in the LAD and severe stenosis of OM, given symptoms of chest pain presents for elective LHC to R/O CAD.     CM: SR  Neuro: A&OX3, CN 2-12 intact  HEENT: NC, AT  Lungs: CTA B/L  CV: S1, S2, no murmur, RRR  Abd: Soft  Extremity: Right radial band: no bleeding, fingers warm with good cap refil    EKG: NSR                          15.0   7.05  )-----------( 243      ( 31 Jul 2023 08:25 )             48.3     07-31    135  |  98    |  22.5  ----------------------------<  141  3.9   |  28.0  |  0.67    Ca    8.9      31 Jul 2023 08:25  Mg     2.3     07-31    Lipids       Total Cholesterol: 93       Triglycerides: 86       HDL: 53       Non-HDL: 40       LDL: 23    HgbA1C: 23

## 2023-07-31 NOTE — DISCHARGE NOTE PROVIDER - NSDCCPTREATMENT_GEN_ALL_CORE_FT
PRINCIPAL PROCEDURE  Procedure: Initial insertion of drug eluting stent into left circumflex coronary artery  Findings and Treatment:   Coronary Angiography   The coronary circulation is right dominant.    LM   Left main artery: Angiography shows minor irregularities.    LAD   Left anterior descending artery: LAD- mild diffuse disease. D1- 80-90% long area of ostial/proximal/mid disease. .  CX   Circumflex: Proximal 30-40%, Mid 80% (treated with SYNERGY XD 2.5 X 28MM MARA), Distal 75%. Heavily calcified disease (treated with SYNERGY XD 2.5 X 16MM MARA).  RCA   Right coronary artery: Heavily calcified vessel. Diffuse 20-30% proximal, mid 40-50%, Distal 30-40%. .  Left Heart Cath   LHC performed: Aortic valve crossed and left ventricular pressures were obtained. Hemodynamic assessment demonstrates mildly elevated LVEDP.

## 2023-07-31 NOTE — DISCHARGE NOTE PROVIDER - NSDCFUSCHEDAPPT_GEN_ALL_CORE_FT
CARDIOLOGY     PROGRESS  NOTE   ________________________________________________    CHIEF COMPLAINT:Patient is a 86y old  Male who presents with a chief complaint of edema (06 Dec 2020 06:48)  no complain.  	  REVIEW OF SYSTEMS:  CONSTITUTIONAL: No fever, weight loss, or fatigue  EYES: No eye pain, visual disturbances, or discharge  ENT:  No difficulty hearing, tinnitus, vertigo; No sinus or throat pain  NECK: No pain or stiffness  RESPIRATORY: No cough, wheezing, chills or hemoptysis; No Shortness of Breath  CARDIOVASCULAR: No chest pain, palpitations, passing out, dizziness, or leg swelling  GASTROINTESTINAL: No abdominal or epigastric pain. No nausea, vomiting, or hematemesis; No diarrhea or constipation. No melena or hematochezia.  GENITOURINARY: No dysuria, frequency, hematuria, or incontinence  NEUROLOGICAL: No headaches, memory loss, loss of strength, numbness, or tremors  SKIN: No itching, burning, rashes, or lesions   LYMPH Nodes: No enlarged glands  ENDOCRINE: No heat or cold intolerance; No hair loss  MUSCULOSKELETAL: No joint pain or swelling; No muscle, back, or extremity pain  PSYCHIATRIC: No depression, anxiety, mood swings, or difficulty sleeping  HEME/LYMPH: No easy bruising, or bleeding gums  ALLERGY AND IMMUNOLOGIC: No hives or eczema	    [ ] All others negative	  [ ] Unable to obtain    PHYSICAL EXAM:  T(C): 36.4 (12-06-20 @ 03:00), Max: 36.9 (12-05-20 @ 15:34)  HR: 55 (12-06-20 @ 03:00) (55 - 64)  BP: 117/57 (12-06-20 @ 03:00) (106/52 - 145/67)  RR: 18 (12-06-20 @ 03:00) (18 - 18)  SpO2: 97% (12-06-20 @ 03:00) (96% - 97%)  Wt(kg): --  I&O's Summary    05 Dec 2020 07:01  -  06 Dec 2020 07:00  --------------------------------------------------------  IN: 850 mL / OUT: 2025 mL / NET: -1175 mL        Appearance: Normal	  HEENT:   Normal oral mucosa, PERRL, EOMI	  Lymphatic: No lymphadenopathy  Cardiovascular: Normal S1 S2, No JVD, + murmurs, No edema  Respiratory: Lungs clear to auscultation	  Psychiatry: A & O x 3, Mood & affect appropriate  Gastrointestinal:  Soft, Non-tender, + BS	  Skin: No rashes, No ecchymoses, No cyanosis	  Neurologic: Non-focal  Extremities: Normal range of motion, No clubbing, cyanosis or edema  Vascular: Peripheral pulses palpable 2+ bilaterally    MEDICATIONS  (STANDING):  aMIOdarone    Tablet 200 milliGRAM(s) Oral daily  apixaban 5 milliGRAM(s) Oral two times a day  aspirin enteric coated 81 milliGRAM(s) Oral daily  chlorhexidine 2% Cloths 1 Application(s) Topical <User Schedule>  furosemide    Tablet 40 milliGRAM(s) Oral daily  metoprolol tartrate 12.5 milliGRAM(s) Oral two times a day  pantoprazole    Tablet 40 milliGRAM(s) Oral before breakfast  polyethylene glycol 3350 17 Gram(s) Oral daily      TELEMETRY: 	    ECG:  	  RADIOLOGY:  OTHER: 	  	  LABS:	 	    CARDIAC MARKERS:                                11.0   13.15 )-----------( 331      ( 06 Dec 2020 06:27 )             32.9     12-06    129<L>  |  95<L>  |  20  ----------------------------<  83  5.1   |  23  |  0.78    Ca    9.3      06 Dec 2020 06:27  Phos  3.8     12-06  Mg     2.2     12-06    TPro  6.4  /  Alb  3.5  /  TBili  0.8  /  DBili  0.3<H>  /  AST  40  /  ALT  173<H>  /  AlkPhos  97  12-05    proBNP: Serum Pro-Brain Natriuretic Peptide: 73027 pg/mL (11-18 @ 12:45)    Lipid Profile: Cholesterol 106  LDL --  HDL 40  TG 73    HgA1c:   TSH: Thyroid Stimulating Hormone, Serum: 1.69 uIU/mL (11-30 @ 11:01)  Thyroid Stimulating Hormone, Serum: 1.46 uIU/mL (11-19 @ 09:24)  Thyroid Stimulating Hormone, Serum: 1.51 uIU/mL (11-19 @ 08:41)          Assessment and plan  ---------------------------  pt is an 85 y/o man with pmhx of htn, hld, hiatal hernia / GERD, OA post Lt TKR x1 year ago presenting with concern for increasing redness of left leg and swelling for 2 months.   Patient reports that he has been having increasing swelling of both of his legs for approximately 2-3 months; however, the left leg has been more swollen and has been getting increasingly more red. He states that he went to his podiatrist when he noted some rash on the lower portion of his left just above his foot, was given a 'cream of some kind, and feels that the redness and swelling got worse after starting the cream.'   He states that he was having chest pain approximately 1 mo ago, went to his PMD and was diagnosed with a hiatal hernia with an xray and prescribed omeprazole.   Patient reports that he has been becoming increasingly more short of breath for approximately 2 months as well. Feels that when he takes just a few steps he has to stop and catch his breath, and is so short of breath that he cannot speak in complete sentences without rstill in rapid a,fib  continue Amio  po loading  s/p hanna cardioversion, remains in NSR not pacing  fu lft/haley lft are improving  AC  will add small dose of metoprolol if tolerated  physical therapy  continue diuresis  sinus bradycardia, asymptomatic 50 to 60  increase ambulation  fu renal function if increase k will dc aldactone  hr is well controlled, no tachy or renée ,no complain  no indication for pacing  will fu as out pt        	         Florencia Oneal Physician [facility]  CARDIOLOGY [address]  Scheduled Appointment: 08/04/2023

## 2023-08-01 ENCOUNTER — TRANSCRIPTION ENCOUNTER (OUTPATIENT)
Age: 73
End: 2023-08-01

## 2023-08-01 VITALS
SYSTOLIC BLOOD PRESSURE: 100 MMHG | RESPIRATION RATE: 16 BRPM | HEART RATE: 68 BPM | DIASTOLIC BLOOD PRESSURE: 60 MMHG | OXYGEN SATURATION: 100 %

## 2023-08-01 LAB
ANION GAP SERPL CALC-SCNC: 13 MMOL/L — SIGNIFICANT CHANGE UP (ref 5–17)
BUN SERPL-MCNC: 16.4 MG/DL — SIGNIFICANT CHANGE UP (ref 8–20)
CALCIUM SERPL-MCNC: 8.4 MG/DL — SIGNIFICANT CHANGE UP (ref 8.4–10.5)
CHLORIDE SERPL-SCNC: 101 MMOL/L — SIGNIFICANT CHANGE UP (ref 96–108)
CO2 SERPL-SCNC: 21 MMOL/L — LOW (ref 22–29)
CREAT SERPL-MCNC: 0.65 MG/DL — SIGNIFICANT CHANGE UP (ref 0.5–1.3)
EGFR: 100 ML/MIN/1.73M2 — SIGNIFICANT CHANGE UP
GLUCOSE SERPL-MCNC: 150 MG/DL — HIGH (ref 70–99)
HCT VFR BLD CALC: 44.1 % — SIGNIFICANT CHANGE UP (ref 39–50)
HGB BLD-MCNC: 14 G/DL — SIGNIFICANT CHANGE UP (ref 13–17)
MAGNESIUM SERPL-MCNC: 2.3 MG/DL — SIGNIFICANT CHANGE UP (ref 1.6–2.6)
MCHC RBC-ENTMCNC: 21.2 PG — LOW (ref 27–34)
MCHC RBC-ENTMCNC: 31.7 GM/DL — LOW (ref 32–36)
MCV RBC AUTO: 66.7 FL — LOW (ref 80–100)
PLATELET # BLD AUTO: 229 K/UL — SIGNIFICANT CHANGE UP (ref 150–400)
POTASSIUM SERPL-MCNC: 4 MMOL/L — SIGNIFICANT CHANGE UP (ref 3.5–5.3)
POTASSIUM SERPL-SCNC: 4 MMOL/L — SIGNIFICANT CHANGE UP (ref 3.5–5.3)
RBC # BLD: 6.61 M/UL — HIGH (ref 4.2–5.8)
RBC # FLD: 18.3 % — HIGH (ref 10.3–14.5)
SODIUM SERPL-SCNC: 135 MMOL/L — SIGNIFICANT CHANGE UP (ref 135–145)
WBC # BLD: 7.56 K/UL — SIGNIFICANT CHANGE UP (ref 3.8–10.5)
WBC # FLD AUTO: 7.56 K/UL — SIGNIFICANT CHANGE UP (ref 3.8–10.5)

## 2023-08-01 PROCEDURE — 80048 BASIC METABOLIC PNL TOTAL CA: CPT

## 2023-08-01 PROCEDURE — C1769: CPT

## 2023-08-01 PROCEDURE — C9600: CPT | Mod: LC

## 2023-08-01 PROCEDURE — C1725: CPT

## 2023-08-01 PROCEDURE — 82962 GLUCOSE BLOOD TEST: CPT

## 2023-08-01 PROCEDURE — 85027 COMPLETE CBC AUTOMATED: CPT

## 2023-08-01 PROCEDURE — C1874: CPT

## 2023-08-01 PROCEDURE — 93010 ELECTROCARDIOGRAM REPORT: CPT

## 2023-08-01 PROCEDURE — 83735 ASSAY OF MAGNESIUM: CPT

## 2023-08-01 PROCEDURE — 93005 ELECTROCARDIOGRAM TRACING: CPT

## 2023-08-01 PROCEDURE — C1887: CPT

## 2023-08-01 PROCEDURE — 93458 L HRT ARTERY/VENTRICLE ANGIO: CPT | Mod: XU

## 2023-08-01 PROCEDURE — 83036 HEMOGLOBIN GLYCOSYLATED A1C: CPT

## 2023-08-01 PROCEDURE — 36415 COLL VENOUS BLD VENIPUNCTURE: CPT

## 2023-08-01 PROCEDURE — C1894: CPT

## 2023-08-01 PROCEDURE — 85025 COMPLETE CBC W/AUTO DIFF WBC: CPT

## 2023-08-01 PROCEDURE — 80061 LIPID PANEL: CPT

## 2023-08-01 RX ORDER — CLOPIDOGREL BISULFATE 75 MG/1
1 TABLET, FILM COATED ORAL
Qty: 90 | Refills: 3
Start: 2023-08-01 | End: 2024-07-25

## 2023-08-01 RX ORDER — ASPIRIN/CALCIUM CARB/MAGNESIUM 324 MG
1 TABLET ORAL
Refills: 0 | DISCHARGE

## 2023-08-01 RX ORDER — ASPIRIN/CALCIUM CARB/MAGNESIUM 324 MG
1 TABLET ORAL
Qty: 0 | Refills: 0 | DISCHARGE
Start: 2023-08-01

## 2023-08-01 RX ADMIN — TAMSULOSIN HYDROCHLORIDE 0.4 MILLIGRAM(S): 0.4 CAPSULE ORAL at 06:03

## 2023-08-01 RX ADMIN — LOSARTAN POTASSIUM 50 MILLIGRAM(S): 100 TABLET, FILM COATED ORAL at 06:03

## 2023-08-01 NOTE — DISCHARGE NOTE NURSING/CASE MANAGEMENT/SOCIAL WORK - NSDCPEFALRISK_GEN_ALL_CORE
For information on Fall & Injury Prevention, visit: [email or web address] OR  [email or web address] OR  https://www.cdc.gov/steadi/patient.html

## 2023-08-01 NOTE — PROGRESS NOTE ADULT - ASSESSMENT
Procedure: LHC and PCI of the LCX    1. S/P LHC and PCI of the LCX  ·	Post procedure instructions explained.  ·	Medications:   ·	     DAPT: Plavix 75 mg daily and aspirin 81 mg daily  ·	     Statin: Continue Lipitor 40 mg daily  ·	     Beta Blocker: N/A  ·	     RAAS Inhibition: Losartan 50 mg daily  ·	     Other Medications: N/A  ·	Cardiac rehab info provided/referral and communication to cardiac rehab completed  ·	Aggressive lifestyle modification.    2. HLD  ·	LDL Goal: NonHDL < 80, LDL < 55  ·	Lipid Panel: At goal  ·	Statin: Continue Lipitor 40 mg daily  ·	Other: N/A    3. DM  ·	GDMT:   ·	     Diabetic diet.  ·	     FS Q AC and HS with coverage  ·	     HgbA1C: 7.4%  ·	     Basal Insulin: N/A  ·	     Nutritional Insulin: N/A  ·	     Oral Antihyperglycemics: Will restart metformin 1000 mg daily on August3, 2023.  ·	     SGLT2i/GLP1-RA: Will continue Farxiga 5 mg daily    Discharge Planning:   ·	Discharge home today  ·	Follow up with: Dr. Thompson  
73y Male    Assessment and Plan:     1. CAD, S/P PCI of the LCX  ·	Remove radial band at: 1:00PM  ·	DAPT: The patient was loaded with Brilinta 180 mg in the CCL, will load with Plavix 600 mg tonight at 6:00PM, then start Plavix 75mg daily and Aspirin 81mg daily  ·	Statin: Continue Lipitor 40 mg daily  ·	Aggressive lifestyle modification and risk factor reduction.  ·	Cardiac rehab info provided/referral and communication to cardiac rehab completed   ·	CBC, BMP, Mg, EKG in AM  ·	 mL IV bolus    2. HLD  ·	LDL Goal: NonHDL < 80, LDL < 55  ·	Lipid Panel: At goal  ·	Statin: Continue Lipitor 40 mg daily  ·	Other: N/A    3. DM  ·	GDMT:   ·	     Diabetic diet.  ·	     FS Q AC and HS with coverage  ·	     HgbA1C: 7.4%  ·	     Basal Insulin: N/A  ·	     Nutritional Insulin: N/A  ·	     Oral Antihyperglycemics: Will restart metformin 1000 mg daily on August3, 2023.  ·	     SGLT2i/GLP1-RA: Will continue Farxiga 5 mg daily    Discharge Planning:   ·	Probable discharge in AM  ·	Follow up as an outpatient with: Dr. Thompson

## 2023-08-01 NOTE — DISCHARGE NOTE NURSING/CASE MANAGEMENT/SOCIAL WORK - PATIENT PORTAL LINK FT
You can access the FollowMyHealth Patient Portal offered by [facility] by registering at the following website: [email or web address]. By joining [employer]’s FollowMyHealth portal, you will also be able to view your health information using other applications (apps) compatible with our system.

## 2023-08-01 NOTE — PROGRESS NOTE ADULT - SUBJECTIVE AND OBJECTIVE BOX
Department of Cardiology                                                                  [facility]/[place] [address] [place]                                                            Telephone: [phone]. Fax:[phone]                                                                INTERVENTIONAL CARDIOLOGY CATHETERIZATION NOTE     Subjective:  73y Male who had a left heart catheterization which showed:  Coronary Angiography   ·	The coronary circulation is right dominant.    LM   ·	Left main artery: Angiography shows minor irregularities.    LAD   ·	Left anterior descending artery: LAD- mild diffuse disease. D1- 80-90% long area of ostial/proximal/mid disease. .  CX   ·	Circumflex: Proximal 30-40%, Mid 80% (treated with SYNERGY XD 2.5 X 28MM MARA), Distal 75%. Heavily calcified disease (treated with SYNERGY XD 2.5 X 16MM MARA).  RCA   ·	Right coronary artery: Heavily calcified vessel. Diffuse 20-30% proximal, mid 40-50%, Distal 30-40%. .  Left Heart Cath   ·	LHC performed: Aortic valve crossed and left ventricular pressures were obtained. Hemodynamic assessment demonstrates mildly elevated LVEDP.    Interval History: No chest pain, SOB, or palpitations overnight.    HPI: 74 yo male h/o thalassemia-trait, HTN, DM2 with c/o intermittent left sided chest discomfort. Reports chest discomfort as pressure and been going on for a few years, but worse lately occuring randomly and no associated symptoms. He recently had an abnormal cardiac CTA, with multivessal CAD with severe coronary calcifications in the LAD and severe stenosis of OM, given symptoms of chest pain presents for elective LHC to R/O CAD.     PAST MEDICAL & SURGICAL HISTORY:  DM type 2, not at goal  HTN (hypertension)    FAMILY HISTORY:    Allergies: Allergy Status Unknown    Home Medications:  aspirin 81 mg oral capsule: 1 orally 3 times a week (2023 20:33)  atorvastatin 40 mg oral tablet: 1 orally once a day (2023 20:33)  Farxiga 5 mg oral tablet: 1 orally once a day (2023 20:33)  losartan 50 mg oral tablet: 1 orally once a day (2023 20:33)  metFORMIN 1000 mg oral tablet: 1 orally once a day (2023 20:33)  tamsulosin 0.4 mg oral capsule: 1 orally 2 times a day (2023 20:33)    MEDICATIONS  (STANDING):  aspirin  chewable 81 milliGRAM(s) Oral daily  atorvastatin 40 milliGRAM(s) Oral at bedtime  chlorhexidine 4% Liquid 1 Application(s) Topical Once  clopidogrel Tablet 75 milliGRAM(s) Oral daily  dextrose 5%. 1000 milliLiter(s) (100 mL/Hr) IV Continuous <Continuous>  dextrose 5%. 1000 milliLiter(s) (50 mL/Hr) IV Continuous <Continuous>  dextrose 50% Injectable 25 Gram(s) IV Push once  dextrose 50% Injectable 12.5 Gram(s) IV Push once  dextrose 50% Injectable 25 Gram(s) IV Push once  glucagon  Injectable 1 milliGRAM(s) IntraMuscular once  insulin lispro (ADMELOG) corrective regimen sliding scale   SubCutaneous three times a day before meals  losartan 50 milliGRAM(s) Oral daily  tamsulosin 0.4 milliGRAM(s) Oral two times a day    MEDICATIONS  (PRN):  dextrose Oral Gel 15 Gram(s) Oral once PRN Blood Glucose LESS THAN 70 milliGRAM(s)/deciliter    ROS:   General: No fatigue  HEENT: No headache, no epistaxis.  CV: No chest pain, no palpitations.  Respiratory: No SOB, no cough, no wheeze  GI: No nausea    Objective:  Vital Signs Last 24 Hrs  T(C): 36.7 (01 Aug 2023 05:57), Max: 36.7 (01 Aug 2023 05:57)  T(F): 98 (01 Aug 2023 05:57), Max: 98 (01 Aug 2023 05:57)  HR: 68 (01 Aug 2023 08:55) (64 - 70)  BP: 100/60 (01 Aug 2023 08:55) (98/56 - 121/75)  RR: 16 (01 Aug 2023 08:55) (12 - 16)  SpO2: 100% (01 Aug 2023 08:55) (95% - 100%)    Parameters below as of 01 Aug 2023 05:57  Patient On (Oxygen Delivery Method): room air    CM: SR  Neuro: A&OX3, CN 2-12 intact  HEENT: NC, AT  Lungs: CTA B/L  CV: S1, S2, no murmur, RRR  Abd: Soft  Extremity: Right wrist: no bleeding, fingers warm with good cap refil    EK.0   7.56  )-----------( 229      ( 01 Aug 2023 05:56 )             44.1     08-01    135  |  101  |  16.4  ----------------------------<  150  4.0   |  21.0  |  0.65    Ca    8.4      01 Aug 2023 05:56  Mg     2.3         Lipids       Total Cholesterol: 93       Triglycerides: 86       HDL: 53       Non-HDL: 40       LDL: 23    HgbA1C: 7.4%

## 2023-08-03 RX ORDER — METFORMIN HYDROCHLORIDE 850 MG/1
1 TABLET ORAL
Qty: 0 | Refills: 0 | DISCHARGE
Start: 2023-08-03

## 2023-08-04 ENCOUNTER — APPOINTMENT (OUTPATIENT)
Dept: CARDIOLOGY | Facility: CLINIC | Age: 73
End: 2023-08-04
Payer: MEDICAID

## 2023-08-04 VITALS
OXYGEN SATURATION: 98 % | SYSTOLIC BLOOD PRESSURE: 98 MMHG | HEIGHT: 66 IN | WEIGHT: 139 LBS | BODY MASS INDEX: 22.34 KG/M2 | DIASTOLIC BLOOD PRESSURE: 56 MMHG | HEART RATE: 82 BPM

## 2023-08-04 PROCEDURE — 99214 OFFICE O/P EST MOD 30 MIN: CPT | Mod: 25

## 2023-08-04 PROCEDURE — 93000 ELECTROCARDIOGRAM COMPLETE: CPT

## 2023-08-04 RX ORDER — METOPROLOL TARTRATE 50 MG/1
50 TABLET, FILM COATED ORAL DAILY
Qty: 2 | Refills: 0 | Status: DISCONTINUED | COMMUNITY
Start: 2023-04-28 | End: 2023-08-04

## 2023-08-04 NOTE — CARDIOLOGY SUMMARY
[de-identified] : 4/28/23- Sinus 81, left axis, no ST changes, QTc 404  8/4/2023: SR, nonspecific T abnormality

## 2023-08-04 NOTE — DISCUSSION/SUMMARY
[FreeTextEntry1] : 73M Mandarin-speaking h/o thalassemia -trait, HTN, DM2, intermittent L-sided chest discomfort, presents for follow up after cardiac catheterization. LHC on 7/31/2023 revealed LAD with mild diffuse disease, D1 80-90%, mid Cx 80% distal 75% heavily calcified disease, RCA heavily calcified prox diffuse 20-30%, mid 40-50%, distal 30-40%; DESx 2 to mid and distal Cx.   Assessment/ Plan: Per cath report, is symptoms persist would consider PCI to diag. Patient states chest discomfort now fully resolved. Importance of medication compliance was discussed with patient today. Patient was advised not to stop taking cardiovascular medications without consulting with the cardiologist first. BP low, asymptomatic at this time. Continue DAPT, statin and all other current medications. Lifestyle modifications for cardiovascular health, including dietary and exercise recommendations, were discussed with patient today. Patient was advised to contact the office or seek emergency medical care for any new or concerning symptoms. Follow up with Dr. Thompson in one month or sooner if needed.   Florencia Mchugh was present in office at the time of visit. [EKG obtained to assist in diagnosis and management of assessed problem(s)] : EKG obtained to assist in diagnosis and management of assessed problem(s)

## 2023-08-04 NOTE — HISTORY OF PRESENT ILLNESS
[FreeTextEntry1] : 8/4/2023: Patient presents to the office for follow up after cardiac catheterization. C on 7/31/2023 revealed LAD with mild diffuse disease, D1 80-90%, mid Cx 80% distal 75% heavily calcified disease, RCA heavily calcified prox diffuse 20-30%, mid 40-50%, distal 30-40%; DESx 2 to mid and distal Cx. He is accompanied by his daughter, Maribel, who assisted in Mandarin- English translation today. Reports feeling well. States the discomfort in his chest is not gone since the stents placed. Denies chest pain, SOB at rest, BAGLEY, palpitations, lightheadedness, dizziness, fatigue, syncope, near syncope and LE edema. Denies smoking, excessive alcohol and illicit drug use.   Prior visit 4/28/2023: 73M Mandarin-speaking h/o thalassemia -trait, HTN, DM2, intermittent L-sided chest discomfort, presents for cardiology evaluation.   Patient presents with his wife/daughter for the visit.  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 Recollect had prior exercise stress test >20yrs ago.    PCP Dr. Pollo Wilder [phone]; fax [phone] Recent lab: A1c 6.6%, LDL 70, Cr. 0.67  Nonsmoker, no alcohol use No CAD or stroke in family Lives in [place], ordinally from [facility]

## 2023-08-04 NOTE — PHYSICAL EXAM
[Well Developed] : well developed [No Acute Distress] : no acute distress [No Carotid Bruit] : no carotid bruit [Normal S1, S2] : normal S1, S2 [No Murmur] : no murmur [Clear Lung Fields] : clear lung fields [No Respiratory Distress] : no respiratory distress  [Normal Gait] : normal gait [No Edema] : no edema [Normal Radial B/L] : normal radial B/L [Moves all extremities] : moves all extremities [Normal Speech] : normal speech [Alert and Oriented] : alert and oriented [Normal memory] : normal memory [de-identified] : right radial access site is clean and intact with no edema, erythema and exudate noted.

## 2023-08-04 NOTE — REVIEW OF SYSTEMS
[Feeling Fatigued] : not feeling fatigued [SOB] : no shortness of breath [Dyspnea on exertion] : not dyspnea during exertion [Chest Discomfort] : no chest discomfort [Lower Ext Edema] : no extremity edema [Palpitations] : no palpitations [Orthopnea] : no orthopnea [Syncope] : no syncope [Dizziness] : no dizziness

## 2023-08-17 ENCOUNTER — NON-APPOINTMENT (OUTPATIENT)
Age: 73
End: 2023-08-17

## 2023-08-21 ENCOUNTER — NON-APPOINTMENT (OUTPATIENT)
Age: 73
End: 2023-08-21

## 2023-08-21 ENCOUNTER — APPOINTMENT (OUTPATIENT)
Dept: CARDIOLOGY | Facility: CLINIC | Age: 73
End: 2023-08-21
Payer: MEDICAID

## 2023-08-21 VITALS
DIASTOLIC BLOOD PRESSURE: 58 MMHG | HEIGHT: 66 IN | WEIGHT: 139 LBS | HEART RATE: 80 BPM | BODY MASS INDEX: 22.34 KG/M2 | SYSTOLIC BLOOD PRESSURE: 90 MMHG | OXYGEN SATURATION: 97 %

## 2023-08-21 VITALS — DIASTOLIC BLOOD PRESSURE: 64 MMHG | SYSTOLIC BLOOD PRESSURE: 110 MMHG

## 2023-08-21 PROCEDURE — 99215 OFFICE O/P EST HI 40 MIN: CPT | Mod: 25

## 2023-08-21 PROCEDURE — 93000 ELECTROCARDIOGRAM COMPLETE: CPT

## 2023-08-21 RX ORDER — METFORMIN HYDROCHLORIDE 1000 MG/1
1000 TABLET, COATED ORAL
Refills: 0 | Status: ACTIVE | COMMUNITY
Start: 2023-04-28

## 2023-08-21 RX ORDER — KRILL/OM-3/DHA/EPA/PHOSPHO/AST 1000-230MG
81 CAPSULE ORAL DAILY
Refills: 0 | Status: DISCONTINUED | COMMUNITY
Start: 2023-04-28 | End: 2023-08-21

## 2023-08-21 NOTE — DISCUSSION/SUMMARY
[EKG obtained to assist in diagnosis and management of assessed problem(s)] : EKG obtained to assist in diagnosis and management of assessed problem(s) [FreeTextEntry1] : Discussed with and seen by Dr Thompson A/P 73M Mandarin-speaking h/o thalassemia -trait, HTN, DM2, intermittent L-sided chest discomfort, s/p abnormal CCTA, underwent LHC on 7/31/2023 revealed LAD with mild diffuse disease, D1 80-90%, mid Cx 80% distal 75% heavily calcified disease, RCA heavily calcified prox diffuse 20-30%, mid 40-50%, distal 30-40%; DESx 2 to mid and distal Cx. As per cath report, severe diagonal disease, long diffuse area- preferred to manage medically, consider DIAG PCI if symptoms persist.  Pt reporting constant chest discomfort x 2-3 days, EKG is non ischemic   1. CAD s/p PCI MARA x 2 LCx on 7/31/2023, with severe residual DIAG disease: pt reporting atypical chest pressure x 2-3 days, able to continue tile work. Will add Pantoprazole 40 mg daily (was seen by GI last month for reflux symptoms and omeprazole was added) and schedule NST, pt wants to walk on the treadmill if ischemic add ranexa, consider PCI , if symptoms worsen pt is advised to seek emergency medical care. 2. HTN: reporting lightheadedness, will decrease losartan to 25 mg daily, advised to continue good hydration, follow BP at home 3. HLD: LDL 27 at goal on Statin  Follow-up with Dr. Thompson  in 1 month  Anel Degroot PA-C

## 2023-08-21 NOTE — PHYSICAL EXAM
[Well Developed] : well developed [No Acute Distress] : no acute distress [No Carotid Bruit] : no carotid bruit [Normal S1, S2] : normal S1, S2 [No Murmur] : no murmur [Clear Lung Fields] : clear lung fields [No Respiratory Distress] : no respiratory distress  [Normal Gait] : normal gait [No Edema] : no edema [Normal Radial B/L] : normal radial B/L [Moves all extremities] : moves all extremities [Normal Speech] : normal speech [Alert and Oriented] : alert and oriented [Normal memory] : normal memory [No Cyanosis] : no cyanosis [Well Nourished] : well nourished [No Rub] : no rub [No Gallop] : no gallop [Soft] : abdomen soft [Non Tender] : non-tender [Normal Bowel Sounds] : normal bowel sounds

## 2023-08-21 NOTE — CARDIOLOGY SUMMARY
[de-identified] : 4/28/23- Sinus 81, left axis, no ST changes, QTc 404 8/4/2023: SR, nonspecific T abnormality  8/21/2023- Sinus  81, no acute ST T changes QTc 416 [de-identified] : 5/18/2023  TTE   CONCLUSIONS: 1. The left ventricular systolic function is normal with an ejection fraction of 63 % by Calabrese's method of disks with an ejection fraction visually estimated at 60 to 65 %. 2. Impaired relaxation pattern. 3. Normal right ventricular cavity size and normal systolic function, estimated PASP 22 mmHg. 4. Redundant and thickened hypermobile septal leaflet of the tricuspid valve. 5. The aortic root at sinuses of Valsalva is moderately dilated 4.6 cm index to BSA 2.7 cm/m2. Ascending aorta 3.9 cm. 6. No pericardial effusion seen. 7. No prior echocardiogram is available for comparison.

## 2023-08-21 NOTE — HISTORY OF PRESENT ILLNESS
[FreeTextEntry1] : 73M Mandarin-speaking h/o thalassemia -trait, HTN, DM2, intermittent L-sided chest discomfort, presented for cardiology evaluation 4/2023, TTE 5/2023 LVE 60-65%,  aortic root at sinuses of Valsalva  moderately dilated 4.6 cm, Asc aorta 3.9 cm,   Ascending aorta 3.9 cm. CCTA showed multivessel CAD, referred for LHC which was performed 7/31/2023 revealed LAD with mild diffuse disease, D1 80-90%, mid Cx 80% distal 75% heavily calcified disease, RCA heavily calcified prox diffuse 20-30%, mid 40-50%, distal 30-40%; DESx 2 to mid and distal Cx.  Last visit 8/4/2023 with resolution of chest pain post PCI. Pt presents for routine follow up.   Pt is accompanied by his wife and daughter [place], daughter provides translation Pt is reporting constant retrosternal 5/10 chest pressure  x 2-3 days., able to continue tile work, no aggravating or alleviating factors, not reproducible to palpation, similar but less severe than prior to PCI. 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 LHC on 7/31/2023 revealed LAD with mild diffuse disease, D1 80-90%, mid Cx 80% distal 75% heavily calcified disease, RCA heavily calcified prox diffuse 20-30%, mid 40-50%, distal 30-40%; DESx 2 to mid and distal Cx. He is accompanied by his daughter, Maribel, who assisted in Mandarin- English translation today. Reports feeling well. States the discomfort in his chest is not gone since the stents placed. Denies chest pain, SOB at rest, BAGLEY, palpitations, lightheadedness, dizziness, fatigue, syncope, near syncope and LE edema. Denies smoking, excessive alcohol and illicit drug use.      Prior visit 4/2023 Patient presents with his wife/daughter for the visit.  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 Recollect had prior exercise stress test >20yrs ago.    PCP Dr. Pollo Wilder [phone]; fax [phone] Recent lab: A1c 6.6%, LDL 70, Cr. 0.67  Nonsmoker, no alcohol use No CAD or stroke in family Lives in [place], ordinally from [facility]

## 2023-08-21 NOTE — ADDENDUM
[FreeTextEntry1] : Dizziness/palpitations in position from bending to standing supsect orthostatic, reduce losartan dose to 25mg Persistent nonexertional chest pain x3 days, EKG no ischemic abnormality, trial of pantoprazole, arrange for exercise nuclear stress test to discern if any significant ischemia along anterolateral wall, if not then continue medical management for complex diagonal branch lesion.

## 2023-08-21 NOTE — REVIEW OF SYSTEMS
[Chest Discomfort] : chest discomfort [Palpitations] : palpitations [Dizziness] : dizziness [Negative] : Integumentary [Fever] : no fever [Chills] : no chills [Feeling Fatigued] : not feeling fatigued [SOB] : no shortness of breath [Dyspnea on exertion] : not dyspnea during exertion [Lower Ext Edema] : no extremity edema [Leg Claudication] : no intermittent leg claudication [Orthopnea] : no orthopnea [PND] : no PND [Syncope] : no syncope [Cough] : no cough [Wheezing] : no wheezing [Coughing Up Blood] : no hemoptysis [Blood in stool] : no blood in stoo [Hematuria] : no hematuria [Easy Bleeding] : no tendency for easy bleeding

## 2023-09-12 ENCOUNTER — NON-APPOINTMENT (OUTPATIENT)
Age: 73
End: 2023-09-12

## 2023-09-12 ENCOUNTER — APPOINTMENT (OUTPATIENT)
Dept: CARDIOLOGY | Facility: CLINIC | Age: 73
End: 2023-09-12
Payer: MEDICAID

## 2023-09-12 PROCEDURE — 93015 CV STRESS TEST SUPVJ I&R: CPT

## 2023-09-12 PROCEDURE — A9500: CPT

## 2023-09-12 PROCEDURE — 78452 HT MUSCLE IMAGE SPECT MULT: CPT

## 2023-09-12 RX ORDER — LOSARTAN POTASSIUM 25 MG/1
25 TABLET, FILM COATED ORAL
Qty: 90 | Refills: 1 | Status: DISCONTINUED | COMMUNITY
Start: 2023-04-28 | End: 2023-09-12

## 2023-09-13 ENCOUNTER — NON-APPOINTMENT (OUTPATIENT)
Age: 73
End: 2023-09-13

## 2023-10-06 ENCOUNTER — NON-APPOINTMENT (OUTPATIENT)
Age: 73
End: 2023-10-06

## 2023-10-06 ENCOUNTER — APPOINTMENT (OUTPATIENT)
Dept: CARDIOLOGY | Facility: CLINIC | Age: 73
End: 2023-10-06
Payer: MEDICAID

## 2023-10-06 VITALS
HEIGHT: 66 IN | WEIGHT: 139 LBS | BODY MASS INDEX: 22.34 KG/M2 | SYSTOLIC BLOOD PRESSURE: 102 MMHG | OXYGEN SATURATION: 98 % | HEART RATE: 60 BPM | DIASTOLIC BLOOD PRESSURE: 64 MMHG

## 2023-10-06 PROCEDURE — 99214 OFFICE O/P EST MOD 30 MIN: CPT | Mod: 25

## 2023-10-06 PROCEDURE — 93000 ELECTROCARDIOGRAM COMPLETE: CPT

## 2023-10-06 RX ORDER — TAMSULOSIN HYDROCHLORIDE 0.4 MG/1
0.4 CAPSULE ORAL DAILY
Qty: 90 | Refills: 3 | Status: ACTIVE | COMMUNITY
Start: 2023-05-03

## 2023-12-12 ENCOUNTER — APPOINTMENT (OUTPATIENT)
Dept: CARDIOLOGY | Facility: CLINIC | Age: 73
End: 2023-12-12
Payer: MEDICAID

## 2023-12-12 VITALS
DIASTOLIC BLOOD PRESSURE: 62 MMHG | OXYGEN SATURATION: 98 % | HEART RATE: 84 BPM | WEIGHT: 139 LBS | SYSTOLIC BLOOD PRESSURE: 102 MMHG | BODY MASS INDEX: 22.34 KG/M2 | HEIGHT: 66 IN

## 2023-12-12 PROCEDURE — 99214 OFFICE O/P EST MOD 30 MIN: CPT | Mod: 25

## 2023-12-12 PROCEDURE — 93000 ELECTROCARDIOGRAM COMPLETE: CPT

## 2024-01-18 ENCOUNTER — NON-APPOINTMENT (OUTPATIENT)
Age: 74
End: 2024-01-18

## 2024-01-18 ENCOUNTER — APPOINTMENT (OUTPATIENT)
Dept: CARDIOLOGY | Facility: CLINIC | Age: 74
End: 2024-01-18
Payer: MEDICAID

## 2024-01-18 VITALS
OXYGEN SATURATION: 96 % | HEIGHT: 66 IN | HEART RATE: 85 BPM | BODY MASS INDEX: 22.5 KG/M2 | WEIGHT: 140 LBS | DIASTOLIC BLOOD PRESSURE: 74 MMHG | SYSTOLIC BLOOD PRESSURE: 106 MMHG

## 2024-01-18 DIAGNOSIS — I20.9 ANGINA PECTORIS, UNSPECIFIED: ICD-10-CM

## 2024-01-18 PROCEDURE — 99214 OFFICE O/P EST MOD 30 MIN: CPT | Mod: 25

## 2024-01-18 PROCEDURE — 99215 OFFICE O/P EST HI 40 MIN: CPT | Mod: 25

## 2024-01-18 PROCEDURE — 93000 ELECTROCARDIOGRAM COMPLETE: CPT

## 2024-02-01 ENCOUNTER — OUTPATIENT (OUTPATIENT)
Dept: OUTPATIENT SERVICES | Facility: HOSPITAL | Age: 74
LOS: 1 days | End: 2024-02-01
Payer: COMMERCIAL

## 2024-02-01 DIAGNOSIS — Z01.818 ENCOUNTER FOR OTHER PREPROCEDURAL EXAMINATION: ICD-10-CM

## 2024-02-01 DIAGNOSIS — Z98.890 OTHER SPECIFIED POSTPROCEDURAL STATES: Chronic | ICD-10-CM

## 2024-02-01 DIAGNOSIS — Z95.5 PRESENCE OF CORONARY ANGIOPLASTY IMPLANT AND GRAFT: Chronic | ICD-10-CM

## 2024-02-01 DIAGNOSIS — I25.10 ATHEROSCLEROTIC HEART DISEASE OF NATIVE CORONARY ARTERY WITHOUT ANGINA PECTORIS: ICD-10-CM

## 2024-02-01 LAB
A1C WITH ESTIMATED AVERAGE GLUCOSE RESULT: 7 % — HIGH (ref 4–5.6)
ALBUMIN SERPL ELPH-MCNC: 4.3 G/DL — SIGNIFICANT CHANGE UP (ref 3.3–5.2)
ALP SERPL-CCNC: 48 U/L — SIGNIFICANT CHANGE UP (ref 40–120)
ALT FLD-CCNC: 16 U/L — SIGNIFICANT CHANGE UP
ANION GAP SERPL CALC-SCNC: 12 MMOL/L — SIGNIFICANT CHANGE UP (ref 5–17)
APTT BLD: 29.4 SEC — SIGNIFICANT CHANGE UP (ref 24.5–35.6)
AST SERPL-CCNC: 16 U/L — SIGNIFICANT CHANGE UP
BASOPHILS # BLD AUTO: 0.04 K/UL — SIGNIFICANT CHANGE UP (ref 0–0.2)
BASOPHILS NFR BLD AUTO: 0.6 % — SIGNIFICANT CHANGE UP (ref 0–2)
BILIRUB SERPL-MCNC: 0.4 MG/DL — SIGNIFICANT CHANGE UP (ref 0.4–2)
BLD GP AB SCN SERPL QL: SIGNIFICANT CHANGE UP
BUN SERPL-MCNC: 20.1 MG/DL — HIGH (ref 8–20)
CALCIUM SERPL-MCNC: 9.2 MG/DL — SIGNIFICANT CHANGE UP (ref 8.4–10.5)
CHLORIDE SERPL-SCNC: 99 MMOL/L — SIGNIFICANT CHANGE UP (ref 96–108)
CHOLEST SERPL-MCNC: 92 MG/DL — SIGNIFICANT CHANGE UP
CO2 SERPL-SCNC: 27 MMOL/L — SIGNIFICANT CHANGE UP (ref 22–29)
CREAT SERPL-MCNC: 1.01 MG/DL — SIGNIFICANT CHANGE UP (ref 0.5–1.3)
EGFR: 79 ML/MIN/1.73M2 — SIGNIFICANT CHANGE UP
EOSINOPHIL # BLD AUTO: 0.24 K/UL — SIGNIFICANT CHANGE UP (ref 0–0.5)
EOSINOPHIL NFR BLD AUTO: 3.7 % — SIGNIFICANT CHANGE UP (ref 0–6)
ESTIMATED AVERAGE GLUCOSE: 154 MG/DL — HIGH (ref 68–114)
GLUCOSE SERPL-MCNC: 150 MG/DL — HIGH (ref 70–99)
HCT VFR BLD CALC: 44.9 % — SIGNIFICANT CHANGE UP (ref 39–50)
HDLC SERPL-MCNC: 43 MG/DL — SIGNIFICANT CHANGE UP
HGB BLD-MCNC: 14.4 G/DL — SIGNIFICANT CHANGE UP (ref 13–17)
IMM GRANULOCYTES NFR BLD AUTO: 0.3 % — SIGNIFICANT CHANGE UP (ref 0–0.9)
INR BLD: 0.93 RATIO — SIGNIFICANT CHANGE UP (ref 0.85–1.18)
LIPID PNL WITH DIRECT LDL SERPL: 16 MG/DL — SIGNIFICANT CHANGE UP
LYMPHOCYTES # BLD AUTO: 1.66 K/UL — SIGNIFICANT CHANGE UP (ref 1–3.3)
LYMPHOCYTES # BLD AUTO: 25.5 % — SIGNIFICANT CHANGE UP (ref 13–44)
MAGNESIUM SERPL-MCNC: 2.2 MG/DL — SIGNIFICANT CHANGE UP (ref 1.6–2.6)
MCHC RBC-ENTMCNC: 22.2 PG — LOW (ref 27–34)
MCHC RBC-ENTMCNC: 32.1 GM/DL — SIGNIFICANT CHANGE UP (ref 32–36)
MCV RBC AUTO: 69.1 FL — LOW (ref 80–100)
MONOCYTES # BLD AUTO: 0.7 K/UL — SIGNIFICANT CHANGE UP (ref 0–0.9)
MONOCYTES NFR BLD AUTO: 10.7 % — SIGNIFICANT CHANGE UP (ref 2–14)
NEUTROPHILS # BLD AUTO: 3.86 K/UL — SIGNIFICANT CHANGE UP (ref 1.8–7.4)
NEUTROPHILS NFR BLD AUTO: 59.2 % — SIGNIFICANT CHANGE UP (ref 43–77)
NON HDL CHOLESTEROL: 49 MG/DL — SIGNIFICANT CHANGE UP
PLATELET # BLD AUTO: 239 K/UL — SIGNIFICANT CHANGE UP (ref 150–400)
POTASSIUM SERPL-MCNC: 4.7 MMOL/L — SIGNIFICANT CHANGE UP (ref 3.5–5.3)
POTASSIUM SERPL-SCNC: 4.7 MMOL/L — SIGNIFICANT CHANGE UP (ref 3.5–5.3)
PROT SERPL-MCNC: 7 G/DL — SIGNIFICANT CHANGE UP (ref 6.6–8.7)
PROTHROM AB SERPL-ACNC: 10.3 SEC — SIGNIFICANT CHANGE UP (ref 9.5–13)
RBC # BLD: 6.5 M/UL — HIGH (ref 4.2–5.8)
RBC # FLD: 18.5 % — HIGH (ref 10.3–14.5)
SODIUM SERPL-SCNC: 137 MMOL/L — SIGNIFICANT CHANGE UP (ref 135–145)
TRIGL SERPL-MCNC: 166 MG/DL — HIGH
WBC # BLD: 6.52 K/UL — SIGNIFICANT CHANGE UP (ref 3.8–10.5)
WBC # FLD AUTO: 6.52 K/UL — SIGNIFICANT CHANGE UP (ref 3.8–10.5)

## 2024-02-01 PROCEDURE — 93010 ELECTROCARDIOGRAM REPORT: CPT

## 2024-02-01 PROCEDURE — 93005 ELECTROCARDIOGRAM TRACING: CPT

## 2024-02-01 PROCEDURE — G0463: CPT

## 2024-02-01 RX ORDER — LOSARTAN POTASSIUM 100 MG/1
1 TABLET, FILM COATED ORAL
Refills: 0 | DISCHARGE

## 2024-02-01 NOTE — H&P PST ADULT - OTHER CARE PROVIDERS
Drew Thompson ([facility] at [place], [address], [address], [place] [address], Phone: [phone], Fax: [phone])

## 2024-02-01 NOTE — H&P PST ADULT - NSICDXFAMILYHX_GEN_ALL_CORE_FT
FAMILY HISTORY:  Father  Still living? Unknown  Family history of diabetes mellitus, Age at diagnosis: Age Unknown  Family history of malignant neoplasm of colon, Age at diagnosis: Age Unknown

## 2024-02-01 NOTE — H&P PST ADULT - NSICDXPASTSURGICALHX_GEN_ALL_CORE_FT
PAST SURGICAL HISTORY:  History of shoulder surgery     Presence of drug coated stent in left circumflex coronary artery

## 2024-02-01 NOTE — H&P PST ADULT - HISTORY OF PRESENT ILLNESS
72y/o male never smoker with history of DM2, HTN, HLD, GERD, and BPH, who was c/o of mid chest discomfort which he describes as "stuffiness" and SOB when climbing stairs. He had a CCTA with FFR which showed significant stenosis in the proximal D1 and the mid to distal circumflex. HE had a LHC which showed 80% stenosis of the mLCX which was treated with a SYNERGY XD 2.5 X 16MM MARA and a SYNERGY XD 2.5 X 28MM MARA, 75% stenosis of the dLCX which was treated with a SYNERGY XD 2.5 X 16MM MARA, with residual 80-90% long area of ostial/proximal/mid D1 disease, and moderate RCA disease. He felt better immediately after the PCI but has began having return of the chest "stuffiness" and BAGLEY. He had a nuclear stress test on 9/12/2023 which showed a very small, very mild reversible perfusion defect at the basal inferior wall, defects persist on prone imaging, suggestive of very mild ischemia vs. attenuation artifact, and no evidence of ischemia along the known obstructive disease 80-90% at the diagonal branch 1 territory.    Symptoms:        Angina (Class): N/A       Ischemic Symptoms: BAGLEY (CCS class 3 anginal equivalent)    Heart Failure: N/A    Assessment of LVEF:       EF: 70%       Assessed by: Nuclear stress test       Date: 9/12/2023    Prior Cardiac Interventions:       PCI's: 7/31/2023  Coronary Angiography   ·	The coronary circulation is right dominant.    LM   ·	Left main artery: Angiography shows minor irregularities.    LAD   ·	Left anterior descending artery: LAD- mild diffuse disease. D1- 80-90% long area of ostial/proximal/mid disease. .  CX   ·	Circumflex: Proximal 30-40%, Mid 80% which was treated with a SYNERGY XD 2.5 X 16MM MARA and a SYNERGY XD 2.5 X 28MM MARA, Distal 75% which was treated with a SYNERGY XD 2.5 X 16MM MARA. Heavily calcified disease.   RCA   ·	Right coronary artery: Heavily calcified vessel. Diffuse 20-30% proximal, mid 40-50%, Distal 30-40%         CABG: N/A    Noninvasive Testing:   Stress Test: 9/12/2023       Protocol: Kong       Duration of Exercise: 7:30       Symptoms: Shortness of breath and chest pain atypical of angina.       EKG Changes: 0.5 mm upsloping ST segment depression in leads V3, V4 and V5 starting at 7:30 minutes during stress at 144 bpm and persisting 00:15 minutes into recovery.       Myocardial Imaging: There is a very small, very mild reversible perfusion defect at the basal inferior wall, defects persist on prone imaging, suggestive of very mild ischemia vs. attenuation artifact. No evidence of ischemia along the known obstructive disease 80-90% at the diagonal branch 1 territory. No TID (ratio 1.03).       Risk Assessment:     Echo: 5/18/2023  Left Ventricle: The left ventricular systolic function is normal with a calculated ejection fraction of 63 % by the Calabrese's biplane method of disks with an ejection fraction visually estimated at 60 to 65%.  Right Ventricle: Normal right ventricular cavity size and normal systolic function. Tricuspid annular plane systolic excursion (TAPSE) is 2.4 cm (normal >=1.7 cm).  Left Atrium: The left atrium is normal, with an indexed volume of 18 ml/m².  Right Atrium: The right atrium is normal with an indexed volume of 11.16 ml/m² and an indexed area of 6.13 cm²/m².  Aortic Valve: The aortic valve appears trileaflet. There is mild thickening of the aortic valve leaflets. There is no aortic stenosis. There is no evidence of aortic regurgitation.  Mitral Valve: There is mitral valve thickening of the anterior leaflet. There is trace mitral regurgitation.  Tricuspid Valve: Structurally normal tricuspid valve with normal leaflet excursion. There is trace tricuspid regurgitation. Estimated pulmonary artery systolic pressure is 22 mmHg.  Pulmonic Valve: The pulmonic valve was not well visualized. There is trace pulmonic regurgitation.  Pulmonary Artery: The main pulmonary artery is normal in size, origin, and position.  Aorta: The ascending aorta and aortic arch appear normal in size. Aortic root at sinuses of Valsalva is moderately dilated measuring 4.61 cm (indexed 2.67 cm/m²).  Pericardium: No pericardial effusion seen.  Systemic Veins: The inferior vena cava is normal (normal <2.1cm) with normal inspiratory collapse (normal >50%) consistent with normal right atrial pressure (\R\3, range 0-5mmHg)    Antianginal Therapies:        Beta Blockers: N/A       Calcium Channel Blockers: N/A       Long Acting Nitrates: N/A       Ranexa: 1000 mg BID    Associated Risk Factors:        Frailty: N/A       Cerebrovascular Disease: N/A       Chronic Lung Disease: N/A       Peripheral Arterial Disease: N/A       Chronic Kidney Disease (if yes, what is GFR): N/A       Uncontrolled Diabetes (if yes, what is HgbA1C or FBS): N/A       Poorly Controlled Hypertension (if yes, what is SBP): N/A       Morbid Obesity (if yes, what is BMI): N/A       History of Recent Ventricular Arrhythmia: N/A       Inability to Ambulate Safely: N/A       Need for Therapeutic Anticoagulation: N/A       Antiplatelet or Contrast Allergy: N/A    Social History:        Marital: , lives with wife       Tobacco: Never smoker       ETOH: Rare       Drugs: Denies       Caffeine: Occasional coffee    ROS:   General: No fevers/chills. No fatigue  HEENT: No hearing loss. No visual disturbances. No headaches. No epistaxis.  Pulmonary: No dyspnea. No wheeze. + cough.  CV: No chest pain. + BAGLEY. No palpitations. No orthopnea. No PND. No edema.  GI: No BRBPR. No melena. No nausea.  : No hematuria.  Neuro: No weakness. No paresthesia. No syncope.  Heme: Bruises easily on Plavix    T(F): 97.9  HR: 80  BP: 100/70  RR: 14  SpO2: 97%  Physical Exam:   General: Awake, alert, speech clear, no acute distress.  Neck: No bruit, no JVD.  Chest: S1, S2. No murmur. CTA.  Abdomen: Soft. Nondistended.  Extremities: No edema. Pulses: DP: Right: 2+, Left: 2+, Radial: Right: 2+, Left: 2+ 72y/o male never smoker with history of DM2, HTN, HLD, GERD, and BPH, who was c/o of mid chest discomfort which he describes as "stuffiness" and SOB when climbing stairs. He had a CCTA with FFR which showed significant stenosis in the proximal D1 and the mid to distal circumflex. HE had a LHC which showed 80% stenosis of the mLCX which was treated with a SYNERGY XD 2.5 X 16MM MARA and a SYNERGY XD 2.5 X 28MM MARA, 75% stenosis of the dLCX which was treated with a SYNERGY XD 2.5 X 16MM MARA, with residual 80-90% long area of ostial/proximal/mid D1 disease, and moderate RCA disease. He felt better immediately after the PCI but has began having return of the chest "stuffiness" and BAGLEY. He had a nuclear stress test on 9/12/2023 which showed a very small, very mild reversible perfusion defect at the basal inferior wall, defects persist on prone imaging, suggestive of very mild ischemia vs. attenuation artifact, and no evidence of ischemia along the known obstructive disease 80-90% at the diagonal branch 1 territory.    Symptoms:        Angina (Class): N/A       Ischemic Symptoms: BAGLEY (CCS class 3 anginal equivalent)    Heart Failure: N/A    Assessment of LVEF:       EF: 70%       Assessed by: Nuclear stress test       Date: 9/12/2023    Prior Cardiac Interventions:       PCI's: 7/31/2023  Coronary Angiography   ·	The coronary circulation is right dominant.    LM   ·	Left main artery: Angiography shows minor irregularities.    LAD   ·	Left anterior descending artery: LAD- mild diffuse disease. D1- 80-90% long area of ostial/proximal/mid disease. .  CX   ·	Circumflex: Proximal 30-40%, Mid 80% which was treated with a SYNERGY XD 2.5 X 16MM MARA and a SYNERGY XD 2.5 X 28MM MARA, Distal 75% which was treated with a SYNERGY XD 2.5 X 16MM MARA. Heavily calcified disease.   RCA   ·	Right coronary artery: Heavily calcified vessel. Diffuse 20-30% proximal, mid 40-50%, Distal 30-40%         CABG: N/A    Noninvasive Testing:   Stress Test: 9/12/2023       Protocol: Kong       Duration of Exercise: 7:30       Symptoms: Shortness of breath and chest pain atypical of angina.       EKG Changes: 0.5 mm upsloping ST segment depression in leads V3, V4 and V5 starting at 7:30 minutes during stress at 144 bpm and persisting 00:15 minutes into recovery.       Myocardial Imaging: There is a very small, very mild reversible perfusion defect at the basal inferior wall, defects persist on prone imaging, suggestive of very mild ischemia vs. attenuation artifact. No evidence of ischemia along the known obstructive disease 80-90% at the diagonal branch 1 territory. No TID (ratio 1.03).       Risk Assessment:     Echo: 5/18/2023  Left Ventricle: The left ventricular systolic function is normal with a calculated ejection fraction of 63 % by the Calabrese's biplane method of disks with an ejection fraction visually estimated at 60 to 65%.  Right Ventricle: Normal right ventricular cavity size and normal systolic function. Tricuspid annular plane systolic excursion (TAPSE) is 2.4 cm (normal >=1.7 cm).  Left Atrium: The left atrium is normal, with an indexed volume of 18 ml/m².  Right Atrium: The right atrium is normal with an indexed volume of 11.16 ml/m² and an indexed area of 6.13 cm²/m².  Aortic Valve: The aortic valve appears trileaflet. There is mild thickening of the aortic valve leaflets. There is no aortic stenosis. There is no evidence of aortic regurgitation.  Mitral Valve: There is mitral valve thickening of the anterior leaflet. There is trace mitral regurgitation.  Tricuspid Valve: Structurally normal tricuspid valve with normal leaflet excursion. There is trace tricuspid regurgitation. Estimated pulmonary artery systolic pressure is 22 mmHg.  Pulmonic Valve: The pulmonic valve was not well visualized. There is trace pulmonic regurgitation.  Pulmonary Artery: The main pulmonary artery is normal in size, origin, and position.  Aorta: The ascending aorta and aortic arch appear normal in size. Aortic root at sinuses of Valsalva is moderately dilated measuring 4.61 cm (indexed 2.67 cm/m²).  Pericardium: No pericardial effusion seen.  Systemic Veins: The inferior vena cava is normal (normal <2.1cm) with normal inspiratory collapse (normal >50%) consistent with normal right atrial pressure (\R\3, range 0-5mmHg)    Antianginal Therapies:        Beta Blockers: N/A       Calcium Channel Blockers: N/A       Long Acting Nitrates: N/A       Ranexa: 1000 mg BID    Associated Risk Factors:        Frailty: N/A       Cerebrovascular Disease: N/A       Chronic Lung Disease: N/A       Peripheral Arterial Disease: N/A       Chronic Kidney Disease (if yes, what is GFR): N/A       Uncontrolled Diabetes (if yes, what is HgbA1C or FBS): N/A       Poorly Controlled Hypertension (if yes, what is SBP): N/A       Morbid Obesity (if yes, what is BMI): N/A       History of Recent Ventricular Arrhythmia: N/A       Inability to Ambulate Safely: N/A       Need for Therapeutic Anticoagulation: N/A       Antiplatelet or Contrast Allergy: N/A    Social History:        Marital: , lives with wife       Tobacco: Never smoker       ETOH: Rare       Drugs: Denies       Caffeine: Occasional coffee    ROS:   General: No fevers/chills. No fatigue  HEENT: No hearing loss. No visual disturbances. No headaches. No epistaxis.  Pulmonary: No dyspnea. No wheeze. + cough.  CV: No chest pain. + BAGLEY. No palpitations. No orthopnea. No PND. No edema.  GI: No BRBPR. No melena. No nausea.  : No hematuria.  Neuro: No weakness. No paresthesia. No syncope.  Heme: Bruises easily on Plavix    T(F): 97.9  HR: 80  BP: 100/70  RR: 14  SpO2: 97%  Physical Exam:   General: Awake, alert, speech clear, no acute distress.  Neck: No bruit, no JVD.  Chest: S1, S2. No murmur. CTA.  Abdomen: Soft. Nondistended.  Extremities: No edema. Pulses: DP: Right: 2+, Left: 2+, Radial: Right: 2+, Left: 2+                          14.4   6.52  )-----------( 239      ( 01 Feb 2024 11:42 )             44.9   02-01    137  |  99  |  20.1  ----------------------------<  150  4.7   |  27.0  |  1.01    Ca    9.2      01 Feb 2024 11:42  Mg     2.2     02-01    TPro  7.0  /  Alb  4.3  /  TBili  0.4  /  DBili  x   /  AST  16  /  ALT  16  /  AlkPhos  48  02-01    PT/INR - ( 01 Feb 2024 11:42 )   PT: 10.3 sec;   INR: 0.93 ratio    PTT - ( 01 Feb 2024 11:42 )  PTT:29.4 sec    Lipids       Total Cholesterol: 91       Triglycerides: 166       HDL: 43       Non-HDL: 49       LDL: 16    HgbA1C: 7.0%

## 2024-02-01 NOTE — H&P PST ADULT - ASSESSMENT
1. Unstable angina with known D1 disease  ·	C and possible intervention on: 2/5/2024  ·	Proceduralist: Dr. Pillai  ·	Procedure to be explained and consent obtained on day of procedure.  ·	     ASA: 3  ·	     Mallampati: 2  ·	     Creatinine:   ·	     GFR:   ·	     ABR:   ·	Medications:  ·	     APT: Will continue Plavix 7mg daily and aspirin 81 mg daily.  ·	     Statin: Will continue Lipitor 40 mg daily  ·	     Beta Blocker: N/A  ·	     Other Antianginal: Will continue Ranexa 1000 mg BID  ·	CARRIE Prophylaxis:  mL IV over 1 hour pre-procedure.  ·	Pre-procedure instructions given and questions answered.    2. HLD  ·	     Goal: LDL < 70  ·	     Lipid Panel: ***  ·	     Statin: Will continue Lipitor 40 mg daily  ·	     Other: N/A    3. DM2  ·	GDMT:   ·	     Diabetic diet.  ·	     FS Q AC and HS with coverage  ·	     HgbA1C: ***  ·	     Basal Insulin: N/A  ·	     Nutritional Insulin: N/A  ·	     Oral Antihyperglycemics: Will stop metformin 1000 mg BID the night before the procedure and resume on February 8, 2024.  ·	     SGLT2i/GLP1-RA: Will stop Farxiga today (last dose this morning) 1. Unstable angina with known D1 disease  ·	[facility] and possible intervention on: 2/5/2024  ·	Proceduralist: Dr. Pillai  ·	Procedure to be explained and consent obtained on day of procedure.  ·	     ASA: 3  ·	     Mallampati: 2  ·	     Creatinine: 1.01  ·	     GFR: 79  ·	     ABR: 1.0%  ·	Medications:  ·	     APT: Will continue Plavix 7mg daily and aspirin 81 mg daily.  ·	     Statin: Will continue Lipitor 40 mg daily  ·	     Beta Blocker: N/A  ·	     Other Antianginal: Will continue Ranexa 1000 mg BID  ·	CARRIE Prophylaxis:  mL IV over 1 hour pre-procedure.  ·	Pre-procedure instructions given and questions answered.    2. HLD  ·	     Goal: LDL < 70  ·	     Lipid Panel: At goal  ·	     Statin: Will continue Lipitor 40 mg daily  ·	     Other: N/A    3. DM2  ·	GDMT:   ·	     Diabetic diet.  ·	     FS Q AC and HS with coverage  ·	     HgbA1C: 7.0%  ·	     Basal Insulin: N/A  ·	     Nutritional Insulin: N/A  ·	     Oral Antihyperglycemics: Will stop metformin 1000 mg BID the night before the procedure and resume on February 8, 2024.  ·	     SGLT2i/GLP1-RA: Will stop Farxiga today (last dose this morning)

## 2024-02-01 NOTE — H&P PST ADULT - NSICDXPASTMEDICALHX_GEN_ALL_CORE_FT
PAST MEDICAL HISTORY:  BPH (benign prostatic hyperplasia)     Coronary artery disease of native artery of native heart with stable angina pectoris     GERD (gastroesophageal reflux disease)     Hyperlipidemia, unspecified hyperlipidemia type     Primary hypertension     Type 2 diabetes mellitus with other circulatory complication, without long-term current use of insul

## 2024-02-05 ENCOUNTER — INPATIENT (INPATIENT)
Facility: HOSPITAL | Age: 74
LOS: 0 days | Discharge: HOME | DRG: 324 | End: 2024-02-06
Attending: INTERNAL MEDICINE | Admitting: INTERNAL MEDICINE
Payer: COMMERCIAL

## 2024-02-05 ENCOUNTER — RX RENEWAL (OUTPATIENT)
Age: 74
End: 2024-02-05

## 2024-02-05 VITALS
DIASTOLIC BLOOD PRESSURE: 77 MMHG | TEMPERATURE: 98 F | HEART RATE: 70 BPM | HEIGHT: 66 IN | RESPIRATION RATE: 17 BRPM | WEIGHT: 140.21 LBS | OXYGEN SATURATION: 99 % | SYSTOLIC BLOOD PRESSURE: 135 MMHG

## 2024-02-05 DIAGNOSIS — I25.10 ATHEROSCLEROTIC HEART DISEASE OF NATIVE CORONARY ARTERY WITHOUT ANGINA PECTORIS: ICD-10-CM

## 2024-02-05 DIAGNOSIS — Z98.890 OTHER SPECIFIED POSTPROCEDURAL STATES: Chronic | ICD-10-CM

## 2024-02-05 DIAGNOSIS — Z95.5 PRESENCE OF CORONARY ANGIOPLASTY IMPLANT AND GRAFT: Chronic | ICD-10-CM

## 2024-02-05 LAB
ABO RH CONFIRMATION: SIGNIFICANT CHANGE UP
GLUCOSE BLDC GLUCOMTR-MCNC: 115 MG/DL — HIGH (ref 70–99)
GLUCOSE BLDC GLUCOMTR-MCNC: 151 MG/DL — HIGH (ref 70–99)
GLUCOSE BLDC GLUCOMTR-MCNC: 160 MG/DL — HIGH (ref 70–99)

## 2024-02-05 RX ORDER — CLOPIDOGREL BISULFATE 75 MG/1
300 TABLET, FILM COATED ORAL ONCE
Refills: 0 | Status: COMPLETED | OUTPATIENT
Start: 2024-02-05 | End: 2024-02-05

## 2024-02-05 RX ORDER — RANOLAZINE 500 MG/1
1 TABLET, FILM COATED, EXTENDED RELEASE ORAL
Refills: 0 | DISCHARGE

## 2024-02-05 RX ORDER — ASPIRIN/CALCIUM CARB/MAGNESIUM 324 MG
81 TABLET ORAL DAILY
Refills: 0 | Status: DISCONTINUED | OUTPATIENT
Start: 2024-02-05 | End: 2024-02-06

## 2024-02-05 RX ORDER — SODIUM CHLORIDE 9 MG/ML
250 INJECTION INTRAMUSCULAR; INTRAVENOUS; SUBCUTANEOUS ONCE
Refills: 0 | Status: COMPLETED | OUTPATIENT
Start: 2024-02-05 | End: 2024-02-05

## 2024-02-05 RX ORDER — DEXTROSE 50 % IN WATER 50 %
25 SYRINGE (ML) INTRAVENOUS ONCE
Refills: 0 | Status: DISCONTINUED | OUTPATIENT
Start: 2024-02-05 | End: 2024-02-06

## 2024-02-05 RX ORDER — PANTOPRAZOLE SODIUM 20 MG/1
40 TABLET, DELAYED RELEASE ORAL
Refills: 0 | Status: DISCONTINUED | OUTPATIENT
Start: 2024-02-05 | End: 2024-02-06

## 2024-02-05 RX ORDER — SODIUM CHLORIDE 9 MG/ML
500 INJECTION INTRAMUSCULAR; INTRAVENOUS; SUBCUTANEOUS ONCE
Refills: 0 | Status: COMPLETED | OUTPATIENT
Start: 2024-02-05 | End: 2024-02-05

## 2024-02-05 RX ORDER — SODIUM CHLORIDE 9 MG/ML
1000 INJECTION, SOLUTION INTRAVENOUS
Refills: 0 | Status: DISCONTINUED | OUTPATIENT
Start: 2024-02-05 | End: 2024-02-06

## 2024-02-05 RX ORDER — ATORVASTATIN CALCIUM 80 MG/1
40 TABLET, FILM COATED ORAL AT BEDTIME
Refills: 0 | Status: DISCONTINUED | OUTPATIENT
Start: 2024-02-05 | End: 2024-02-06

## 2024-02-05 RX ORDER — DAPAGLIFLOZIN 10 MG/1
1 TABLET, FILM COATED ORAL
Refills: 0 | DISCHARGE

## 2024-02-05 RX ORDER — INSULIN LISPRO 100/ML
VIAL (ML) SUBCUTANEOUS
Refills: 0 | Status: DISCONTINUED | OUTPATIENT
Start: 2024-02-05 | End: 2024-02-06

## 2024-02-05 RX ORDER — TAMSULOSIN HYDROCHLORIDE 0.4 MG/1
1 CAPSULE ORAL
Refills: 0 | DISCHARGE

## 2024-02-05 RX ORDER — GLUCAGON INJECTION, SOLUTION 0.5 MG/.1ML
1 INJECTION, SOLUTION SUBCUTANEOUS ONCE
Refills: 0 | Status: DISCONTINUED | OUTPATIENT
Start: 2024-02-05 | End: 2024-02-06

## 2024-02-05 RX ORDER — ATORVASTATIN CALCIUM 80 MG/1
1 TABLET, FILM COATED ORAL
Refills: 0 | DISCHARGE

## 2024-02-05 RX ORDER — RANOLAZINE 500 MG/1
1000 TABLET, FILM COATED, EXTENDED RELEASE ORAL
Refills: 0 | Status: DISCONTINUED | OUTPATIENT
Start: 2024-02-05 | End: 2024-02-06

## 2024-02-05 RX ORDER — DEXTROSE 50 % IN WATER 50 %
15 SYRINGE (ML) INTRAVENOUS ONCE
Refills: 0 | Status: DISCONTINUED | OUTPATIENT
Start: 2024-02-05 | End: 2024-02-06

## 2024-02-05 RX ORDER — DEXTROSE 50 % IN WATER 50 %
12.5 SYRINGE (ML) INTRAVENOUS ONCE
Refills: 0 | Status: DISCONTINUED | OUTPATIENT
Start: 2024-02-05 | End: 2024-02-06

## 2024-02-05 RX ORDER — EPTIFIBATIDE 2 MG/ML
2 INJECTION, SOLUTION INTRAVENOUS
Qty: 75 | Refills: 0 | Status: DISCONTINUED | OUTPATIENT
Start: 2024-02-05 | End: 2024-02-06

## 2024-02-05 RX ORDER — CLOPIDOGREL BISULFATE 75 MG/1
75 TABLET, FILM COATED ORAL DAILY
Refills: 0 | Status: DISCONTINUED | OUTPATIENT
Start: 2024-02-05 | End: 2024-02-06

## 2024-02-05 RX ORDER — ZINC ACETATE DIHYDRATE 100 %
1 CRYSTALS MISCELLANEOUS
Refills: 0 | DISCHARGE

## 2024-02-05 RX ORDER — TAMSULOSIN HYDROCHLORIDE 0.4 MG/1
0.4 CAPSULE ORAL AT BEDTIME
Refills: 0 | Status: DISCONTINUED | OUTPATIENT
Start: 2024-02-05 | End: 2024-02-06

## 2024-02-05 RX ADMIN — SODIUM CHLORIDE 500 MILLILITER(S): 9 INJECTION INTRAMUSCULAR; INTRAVENOUS; SUBCUTANEOUS at 13:47

## 2024-02-05 RX ADMIN — SODIUM CHLORIDE 250 MILLILITER(S): 9 INJECTION INTRAMUSCULAR; INTRAVENOUS; SUBCUTANEOUS at 09:49

## 2024-02-05 RX ADMIN — TAMSULOSIN HYDROCHLORIDE 0.4 MILLIGRAM(S): 0.4 CAPSULE ORAL at 21:07

## 2024-02-05 RX ADMIN — ATORVASTATIN CALCIUM 40 MILLIGRAM(S): 80 TABLET, FILM COATED ORAL at 21:07

## 2024-02-05 RX ADMIN — EPTIFIBATIDE 10.2 MICROGRAM(S)/KG/MIN: 2 INJECTION, SOLUTION INTRAVENOUS at 13:46

## 2024-02-05 RX ADMIN — Medication 2: at 15:54

## 2024-02-05 RX ADMIN — CLOPIDOGREL BISULFATE 300 MILLIGRAM(S): 75 TABLET, FILM COATED ORAL at 13:45

## 2024-02-05 RX ADMIN — RANOLAZINE 1000 MILLIGRAM(S): 500 TABLET, FILM COATED, EXTENDED RELEASE ORAL at 17:01

## 2024-02-05 NOTE — PROGRESS NOTE ADULT - SUBJECTIVE AND OBJECTIVE BOX
[facility]                                              INTERVENTIONAL CARDIOLOGY AT [address], [place] [address]                                             Telephone: [phone]. Fax:[phone]    HPI: 74y/o male never smoker with history of DM2, HTN, HLD, GERD, and BPH, who was c/o of mid chest discomfort which he describes as "stuffiness" and SOB when climbing stairs. He had a CCTA with FFR which showed significant stenosis in the proximal D1 and the mid to distal circumflex. He had a LHC which showed 80% stenosis of the mLCX which was treated with a SYNERGY XD 2.5 X 16MM MARA and a SYNERGY XD 2.5 X 28MM MARA, 75% stenosis of the dLCX which was treated with a SYNERGY XD 2.5 X 16MM MARA, with residual 80-90% long area of ostial/proximal/mid D1 disease, and moderate RCA disease. He felt better immediately after the PCI but has began having return of the chest "stuffiness" and BAGLEY. He had a nuclear stress test on 9/12/2023 which showed a very small, very mild reversible perfusion defect at the basal inferior wall, defects persist on prone imaging, suggestive of very mild ischemia vs. attenuation artifact, and no evidence of ischemia along the known obstructive disease 80-90% at the diagonal branch 1 territory.    Patient seen at [facility] today for elective LHC. Patient states he is now experiencing described chest "stuffiness" 70% of the time during the past week, both at rest and during exertion.    Symptoms:        Angina (Class): IV       Ischemic Symptoms: BAGLEY and chest discomfort at rest and exertion    Heart Failure: N/A    Assessment of LVEF:       EF: 70%       Assessed by: Nuclear stress test       Date: 9/12/2023    Prior Cardiac Interventions:  PCI's: 7/31/2023  Coronary Angiography   The coronary circulation is right dominant.    LM   Left main artery: Angiography shows minor irregularities.    LAD   Left anterior descending artery: LAD- mild diffuse disease. D1- 80-90% long area of ostial/proximal/mid disease  CX   Circumflex: Proximal 30-40%, Mid 80% which was treated with a SYNERGY XD 2.5 X 16MM MARA and a SYNERGY XD 2.5 X 28MM MARA, Distal 75% which was treated with a SYNERGY XD 2.5 X 16MM MARA. Heavily calcified disease.   RCA   Right coronary artery: Heavily calcified vessel. Diffuse 20-30% proximal, mid 40-50%, Distal 30-40%    CABG: N/A    Noninvasive Testing:   Stress Test: 9/12/2023       Protocol: Kong       Duration of Exercise: 7:30       Symptoms: Shortness of breath and chest pain atypical of angina.       EKG Changes: 0.5 mm upsloping ST segment depression in leads V3, V4 and V5 starting at 7:30 minutes during stress at 144 bpm and persisting 00:15 minutes into recovery.       Myocardial Imaging: There is a very small, very mild reversible perfusion defect at the basal inferior wall, defects persist on prone imaging, suggestive of very mild ischemia vs. attenuation artifact. No evidence of ischemia along the known obstructive disease 80-90% at the diagonal branch 1 territory. No TID (ratio 1.03).       Risk Assessment:     Echo: 5/18/2023  Left Ventricle: The left ventricular systolic function is normal with a calculated ejection fraction of 63 % by the Calabrese's biplane method of disks with an ejection fraction visually estimated at 60 to 65%.  Right Ventricle: Normal right ventricular cavity size and normal systolic function. Tricuspid annular plane systolic excursion (TAPSE) is 2.4 cm (normal >=1.7 cm).  Left Atrium: The left atrium is normal, with an indexed volume of 18 ml/m².  Right Atrium: The right atrium is normal with an indexed volume of 11.16 ml/m² and an indexed area of 6.13 cm²/m².  Aortic Valve: The aortic valve appears trileaflet. There is mild thickening of the aortic valve leaflets. There is no aortic stenosis. There is no evidence of aortic regurgitation.  Mitral Valve: There is mitral valve thickening of the anterior leaflet. There is trace mitral regurgitation.  Tricuspid Valve: Structurally normal tricuspid valve with normal leaflet excursion. There is trace tricuspid regurgitation. Estimated pulmonary artery systolic pressure is 22 mmHg.  Pulmonic Valve: The pulmonic valve was not well visualized. There is trace pulmonic regurgitation.  Pulmonary Artery: The main pulmonary artery is normal in size, origin, and position.  Aorta: The ascending aorta and aortic arch appear normal in size. Aortic root at sinuses of Valsalva is moderately dilated measuring 4.61 cm (indexed 2.67 cm/m²).  Pericardium: No pericardial effusion seen.  Systemic Veins: The inferior vena cava is normal (normal <2.1cm) with normal inspiratory collapse (normal >50%) consistent with normal right atrial pressure (, range 0-5mmHg)    Antianginal Therapies:        Beta Blockers: N/A       Calcium Channel Blockers: N/A       Long Acting Nitrates: N/A       Ranexa: 1000 mg BID    Associated Risk Factors:        Frailty: N/A       Cerebrovascular Disease: N/A       Chronic Lung Disease: N/A       Peripheral Arterial Disease: N/A       Chronic Kidney Disease (if yes, what is GFR): N/A       Uncontrolled Diabetes (if yes, what is HgbA1C or FBS): N/A       Poorly Controlled Hypertension (if yes, what is SBP): N/A       Morbid Obesity (if yes, what is BMI): N/A       History of Recent Ventricular Arrhythmia: N/A       Inability to Ambulate Safely: N/A       Need for Therapeutic Anticoagulation: N/A       Antiplatelet or Contrast Allergy: N/A    Social History:        Marital: , lives with wife       Tobacco: Never smoker       ETOH: Rare       Drugs: Denies       Caffeine: Occasional coffee    ROS:   General: No fevers/chills. No fatigue  HEENT: No hearing loss. No visual disturbances. No headaches. No epistaxis.  Pulmonary: Patient endorses SOB or BAGLEY, No dyspnea. No wheeze. + cough.  CV: Patient endorses chest stuffiness and discomfort, no palpitations. no orthopnea. No PND. No edema.  GI: No BRBPR. No melena. No nausea.  : No hematuria.  Neuro: No weakness. No paresthesia. No syncope.  Heme: Bruises easily on Plavix    GENERAL: NAD, lying in bed comfortably  HEAD:  Atraumatic, normocephalic  EYES: EOMI, PERRLA, conjunctiva and sclera clear  NECK: Supple, trachea midline, no JVD  HEART: Regular rate and rhythm, no murmurs, rubs, or gallops  LUNGS: Unlabored respirations.  Clear to auscultation bilaterally, no crackles, wheezing, or rhonchi  ABDOMEN: Soft, nontender, nondistended, +BS  EXTREMITIES: 2+ peripheral pulses bilaterally. No clubbing, cyanosis, or edema  NERVOUS SYSTEM:  A&Ox3, moving all extremities, no focal deficits   SKIN: No rashes or lesions    T(F): 97.9  HR: 80  BP: 100/70  RR: 14  SpO2: 97%  Physical Exam:   General: Awake, alert, speech clear, no acute distress.  Neck: No bruit, no JVD.  Chest: S1, S2. No murmur. CTA.  Abdomen: Soft. Nondistended.  Extremities: No edema. Pulses: DP: Right: 2+, Left: 2+, Radial: Right: 2+, Left: 2+                          14.4   6.52  )-----------( 239      ( 01 Feb 2024 11:42 )             44.9   02-01    137  |  99  |  20.1  ----------------------------<  150  4.7   |  27.0  |  1.01    Ca    9.2      01 Feb 2024 11:42  Mg     2.2     02-01    TPro  7.0  /  Alb  4.3  /  TBili  0.4  /  DBili  x   /  AST  16  /  ALT  16  /  AlkPhos  48  02-01    PT/INR - ( 01 Feb 2024 11:42 )   PT: 10.3 sec;   INR: 0.93 ratio    PTT - ( 01 Feb 2024 11:42 )  PTT:29.4 sec    Lipids       Total Cholesterol: 91       Triglycerides: 166       HDL: 43       Non-HDL: 49       LDL: 16    HgbA1C: 7.0% (01 Feb 2024 12:04)

## 2024-02-05 NOTE — CHART NOTE - NSCHARTNOTEFT_GEN_A_CORE
Patient is now s/p LHC via RRA with radial band in place. Procedure performed by Dr. Pillai. Pt arrived to recovery in NAD and HDS.  RRA access site stable, no bleed/hematoma, distal pulse +2.    Procedure results:  Heavily calcified diag  Balloon angioplasty, rotational atherectomy, orbital atherectomy, intravascular lithotripsy, [place] atherectomy  mLAD MUKUND FRONTIER 3.10f90xs  1st Diag MUKUND FRONTIER 2.35t12ts  LVEDP 10    Medication received during procedure:  Versed: 3mg  Nicardipine: 250 mcg  Lidocaine 1%: 10ml   Verapamil:  5mg  Fentanyl: 175 mcg  Heparin: 13,000 units  Omnipaque: 214 ml    Exam:   Neuro: A&O X4  CV: RRR  Lungs: CTA  Ext: + palp pulses.  Vascular access: RRA.  Site stable. No bleeding/hematoma/ecchymosis.  + cap refill     Plan:  -Formal cath report pending  -Post procedure management/monitoring per protocol  -Access site precautions  -Radial compression band removal at 15:30, patient may be OOB at 16:30  -Labs and EKG in am  -Repeat ECG if any clinical indication or change on tele  - ECHO ordered for tonight, 2/5/24  - Patient with prolonged exposure to fluoroscopy (87.3 minutes). Patient educated regarding risk factors for over exposure and made aware to look out for any skin discoloration, itchiness, rash or irritation.  - Hold metformin for 48 hours until Thursday, 2/8  -NS 500mL bolus post cath ordered  - Patient received Integrilin bolus in CCL. Continue Integrilin drip at 2mcg/kg/min for 6 hours, until 17:00   - 300mg of Plavix ordered post cardiac cath  -Continue current medical therapy, including DAPT with ASA 81mg po daily and Plavix 75mg oral daily  -Cont statin therapy with Lipitor 40mg po qHS   -Educated regarding strict adherence with DAPT   -Educated regarding post procedure management and care  -Discussed the importance of RF modification  -Cardiac rehab info provided/referral and communication to cardiac rehab completed  -F/U outpt in 1-2 weeks with Cardiologist Dr. Thompson  -DISPO: Plan for D/C in am if remains HDS, ECG and labs in am stable and without complications

## 2024-02-05 NOTE — PROGRESS NOTE ADULT - ASSESSMENT
Impression: This is a 73 year old male who presents for an elective LHC in the setting of unstable angina with known D1 disease.       Risk Assessments:       ASA: 3       Mallampati: 2       Creatinine: 1.01       GFR: 79       ABR: 1.0%      Plan:  - Risks of the procedure explained and consent obtained by IC  - Will continue Plavix 7mg daily and aspirin 81 mg daily.  - Will continue Lipitor 40 mg daily  - Will continue Ranexa 1000 mg BID  - CARRIE Prophylaxis:  mL IV over 1 hour pre-procedure ordered   - Pre-procedure instructions given and questions answered. Impression: This is a 73 year old male who presents for an elective LHC in the setting of unstable angina with known D1 disease.       Risk Assessments:       ASA: 3       Mallampati: 2       Creatinine: 1.01       GFR: 79       ABR: 1.0%    Plan:  -plan for LHC via RA vs FA, patient previously had LHC with RRA  -patient seen and examined  -CARRIE prophylaxis 250cc NS bolus ordered  -ECG and Labs reviewed  - Patient took aspirin 81mg and plavix 75mg oral this AM  - Procedure discussed with patient; risks and benefits explained, questions answered  -consent to be obtained by attending IC

## 2024-02-06 ENCOUNTER — TRANSCRIPTION ENCOUNTER (OUTPATIENT)
Age: 74
End: 2024-02-06

## 2024-02-06 VITALS
TEMPERATURE: 98 F | SYSTOLIC BLOOD PRESSURE: 111 MMHG | HEART RATE: 80 BPM | OXYGEN SATURATION: 95 % | RESPIRATION RATE: 18 BRPM | DIASTOLIC BLOOD PRESSURE: 73 MMHG

## 2024-02-06 PROBLEM — I25.118 ATHEROSCLEROTIC HEART DISEASE OF NATIVE CORONARY ARTERY WITH OTHER FORMS OF ANGINA PECTORIS: Chronic | Status: ACTIVE | Noted: 2024-02-01

## 2024-02-06 PROBLEM — N40.0 BENIGN PROSTATIC HYPERPLASIA WITHOUT LOWER URINARY TRACT SYMPTOMS: Chronic | Status: ACTIVE | Noted: 2024-02-01

## 2024-02-06 PROBLEM — E11.59 TYPE 2 DIABETES MELLITUS WITH OTHER CIRCULATORY COMPLICATIONS: Chronic | Status: ACTIVE | Noted: 2024-02-01

## 2024-02-06 LAB
ALBUMIN SERPL ELPH-MCNC: 3.8 G/DL — SIGNIFICANT CHANGE UP (ref 3.3–5.2)
ALP SERPL-CCNC: 54 U/L — SIGNIFICANT CHANGE UP (ref 40–120)
ALT FLD-CCNC: 17 U/L — SIGNIFICANT CHANGE UP
ANION GAP SERPL CALC-SCNC: 9 MMOL/L — SIGNIFICANT CHANGE UP (ref 5–17)
AST SERPL-CCNC: 19 U/L — SIGNIFICANT CHANGE UP
BILIRUB SERPL-MCNC: 0.5 MG/DL — SIGNIFICANT CHANGE UP (ref 0.4–2)
BUN SERPL-MCNC: 15 MG/DL — SIGNIFICANT CHANGE UP (ref 8–20)
CALCIUM SERPL-MCNC: 8.5 MG/DL — SIGNIFICANT CHANGE UP (ref 8.4–10.5)
CHLORIDE SERPL-SCNC: 103 MMOL/L — SIGNIFICANT CHANGE UP (ref 96–108)
CO2 SERPL-SCNC: 27 MMOL/L — SIGNIFICANT CHANGE UP (ref 22–29)
CREAT SERPL-MCNC: 0.89 MG/DL — SIGNIFICANT CHANGE UP (ref 0.5–1.3)
EGFR: 91 ML/MIN/1.73M2 — SIGNIFICANT CHANGE UP
GLUCOSE BLDC GLUCOMTR-MCNC: 146 MG/DL — HIGH (ref 70–99)
GLUCOSE SERPL-MCNC: 157 MG/DL — HIGH (ref 70–99)
HCT VFR BLD CALC: 42.7 % — SIGNIFICANT CHANGE UP (ref 39–50)
HGB BLD-MCNC: 13.6 G/DL — SIGNIFICANT CHANGE UP (ref 13–17)
MAGNESIUM SERPL-MCNC: 2.1 MG/DL — SIGNIFICANT CHANGE UP (ref 1.6–2.6)
MCHC RBC-ENTMCNC: 21.9 PG — LOW (ref 27–34)
MCHC RBC-ENTMCNC: 31.9 GM/DL — LOW (ref 32–36)
MCV RBC AUTO: 68.9 FL — LOW (ref 80–100)
PLATELET # BLD AUTO: 223 K/UL — SIGNIFICANT CHANGE UP (ref 150–400)
POTASSIUM SERPL-MCNC: 4.4 MMOL/L — SIGNIFICANT CHANGE UP (ref 3.5–5.3)
POTASSIUM SERPL-SCNC: 4.4 MMOL/L — SIGNIFICANT CHANGE UP (ref 3.5–5.3)
PROT SERPL-MCNC: 6.2 G/DL — LOW (ref 6.6–8.7)
RBC # BLD: 6.2 M/UL — HIGH (ref 4.2–5.8)
RBC # FLD: 18 % — HIGH (ref 10.3–14.5)
SODIUM SERPL-SCNC: 139 MMOL/L — SIGNIFICANT CHANGE UP (ref 135–145)
WBC # BLD: 8.03 K/UL — SIGNIFICANT CHANGE UP (ref 3.8–10.5)
WBC # FLD AUTO: 8.03 K/UL — SIGNIFICANT CHANGE UP (ref 3.8–10.5)

## 2024-02-06 PROCEDURE — 93306 TTE W/DOPPLER COMPLETE: CPT | Mod: 26

## 2024-02-06 PROCEDURE — C9601: CPT | Mod: LD

## 2024-02-06 PROCEDURE — 92972 PERQ TRLUML CORONRY LITHOTRP: CPT

## 2024-02-06 PROCEDURE — 83735 ASSAY OF MAGNESIUM: CPT

## 2024-02-06 PROCEDURE — 93306 TTE W/DOPPLER COMPLETE: CPT

## 2024-02-06 PROCEDURE — C1724: CPT

## 2024-02-06 PROCEDURE — 93010 ELECTROCARDIOGRAM REPORT: CPT

## 2024-02-06 PROCEDURE — C1885: CPT

## 2024-02-06 PROCEDURE — 80053 COMPREHEN METABOLIC PANEL: CPT

## 2024-02-06 PROCEDURE — 82962 GLUCOSE BLOOD TEST: CPT

## 2024-02-06 PROCEDURE — 93005 ELECTROCARDIOGRAM TRACING: CPT

## 2024-02-06 PROCEDURE — C1769: CPT

## 2024-02-06 PROCEDURE — C9602: CPT | Mod: LD

## 2024-02-06 PROCEDURE — 93458 L HRT ARTERY/VENTRICLE ANGIO: CPT | Mod: 59

## 2024-02-06 PROCEDURE — 85027 COMPLETE CBC AUTOMATED: CPT

## 2024-02-06 PROCEDURE — C1725: CPT

## 2024-02-06 PROCEDURE — C1874: CPT

## 2024-02-06 PROCEDURE — C1894: CPT

## 2024-02-06 PROCEDURE — C1887: CPT

## 2024-02-06 PROCEDURE — C1761: CPT

## 2024-02-06 PROCEDURE — 36415 COLL VENOUS BLD VENIPUNCTURE: CPT

## 2024-02-06 PROCEDURE — C1753: CPT

## 2024-02-06 RX ORDER — PANTOPRAZOLE SODIUM 20 MG/1
1 TABLET, DELAYED RELEASE ORAL
Refills: 0 | DISCHARGE

## 2024-02-06 RX ORDER — PANTOPRAZOLE SODIUM 20 MG/1
1 TABLET, DELAYED RELEASE ORAL
Qty: 30 | Refills: 0
Start: 2024-02-06 | End: 2024-03-06

## 2024-02-06 RX ORDER — CLOPIDOGREL BISULFATE 75 MG/1
1 TABLET, FILM COATED ORAL
Qty: 90 | Refills: 3
Start: 2024-02-06 | End: 2025-01-30

## 2024-02-06 RX ADMIN — RANOLAZINE 1000 MILLIGRAM(S): 500 TABLET, FILM COATED, EXTENDED RELEASE ORAL at 05:42

## 2024-02-06 RX ADMIN — PANTOPRAZOLE SODIUM 40 MILLIGRAM(S): 20 TABLET, DELAYED RELEASE ORAL at 05:42

## 2024-02-06 RX ADMIN — CLOPIDOGREL BISULFATE 75 MILLIGRAM(S): 75 TABLET, FILM COATED ORAL at 10:59

## 2024-02-06 RX ADMIN — Medication 81 MILLIGRAM(S): at 10:59

## 2024-02-06 NOTE — DISCHARGE NOTE PROVIDER - NSDCFUSCHEDAPPT_GEN_ALL_CORE_FT
Gemini Valente  [facility]  CARDIOLOGY [address]  Scheduled Appointment: 02/09/2024

## 2024-02-06 NOTE — DISCHARGE NOTE PROVIDER - HOSPITAL COURSE
72y/o male never smoker with history of DM2, HTN, HLD, GERD, and BPH, who was c/o of mid chest discomfort which he describes as "stuffiness" and SOB when climbing stairs. He had a CCTA with FFR which showed significant stenosis in the proximal D1 and the mid to distal circumflex. HE had a LHC which showed 80% stenosis of the mLCX which was treated with a SYNERGY XD 2.5 X 16MM MARA and a SYNERGY XD 2.5 X 28MM MARA, 75% stenosis of the dLCX which was treated with a SYNERGY XD 2.5 X 16MM MARA, with residual 80-90% long area of ostial/proximal/mid D1 disease, and moderate RCA disease. He felt better immediately after the PCI but has began having return of the chest "stuffiness" and BAGLEY. He had a nuclear stress test on 9/12/2023 which showed a very small, very mild reversible perfusion defect at the basal inferior wall, defects persist on prone imaging, suggestive of very mild ischemia vs. attenuation artifact, and no evidence of ischemia along the known obstructive disease 80-90% at the diagonal branch 1 territory. Patient presents to [facility] on 2/5/24 for elective LHC.      s/p LHC via RRA revealing Heavily calcified Diagonal artery 80-90% s/p Balloon Angioplasty; Rotational Atherectomy; Intravascular lithotripsy; Laser Atherectomy and 1 MARA to First Diagonal (MUKUND FRONTIER 2.30a91ka); and 1 MARA to mLAD (MUKUND FRONTIER 3.5x30MM). Procedure performed by Dr. Pillai. Concern for plaque shift during procedure, Integrelin bolus was initiated intra procedure lab. TTE pending.     PLAN:  1. CAD:  -Formal cath report pending  -Post procedure management/monitoring per protocol  -Access site precautions discussed with patient  -Labs and EKG in am reviewed  -Repeat ECG if any clinical indication or change on tele  -Continue current medical therapy  -Dual anti platelet therapy with aspirin/plavix   -Cont statin therapy with Lipitor 40mg po qHS   -Educated regarding strict adherence with DAPT   -Educated regarding post procedure management and care  -Discussed the importance of RF modification  -Cardiac rehab info provided/referral and communication to cardiac rehab completed  -F/U outpt in 1-2 weeks with Cardiologist Dr. Thompson  -DISPO:  Plan for D/C today if remains HDS, ECG and labs in am stable and without complications and TTE stable.     2. HLD:  -Continue atorvastatin 40mg PO QHS  Continue with your cholesterol medications. Eat a heart healthy diet that is low in saturated fats and salt, and includes whole grains, fruits, vegetables and lean protein; exercise regularly (consult with your physician or cardiologist first); maintain a heart healthy weight; if you smoke - quit (A resource to help you stop smoking is the [facility] for Tobacco Control – phone number [phone].). Continue to follow with your primary physician or cardiologist.  Seek medical help for dizziness, Lightheadedness, Blurry vision, Headache, Chest pain, Shortness of breath    3. DM:  -Ok to continue Farxiga as previously prescribed.  -Please hold Metformin x 48 hours. Ok to resume Thursday 2/8 AM  With a diagnosis of diabetes comes a strict diet regimen to help control blood sugars by watching carbohydrate consumption. Carbohydrates, such as starches, fruits, dairy and starchy vegetables, is the food group that affects glucose levels in the body. Carefully monitoring carbohydrate intake is a main component of the diabetic diet; eating three meals each day that are roughly equivalent in size can help control blood sugars. A registered dietitian can help you plan a diet customized to your individual needs.         72y/o male never smoker with history of DM2, HTN, HLD, GERD, and BPH, who was c/o of mid chest discomfort which he describes as "stuffiness" and SOB when climbing stairs. He had a CCTA with FFR which showed significant stenosis in the proximal D1 and the mid to distal circumflex. HE had a LHC which showed 80% stenosis of the mLCX which was treated with a SYNERGY XD 2.5 X 16MM MARA and a SYNERGY XD 2.5 X 28MM MARA, 75% stenosis of the dLCX which was treated with a SYNERGY XD 2.5 X 16MM MARA, with residual 80-90% long area of ostial/proximal/mid D1 disease, and moderate RCA disease. He felt better immediately after the PCI but has began having return of the chest "stuffiness" and BAGLEY. He had a nuclear stress test on 9/12/2023 which showed a very small, very mild reversible perfusion defect at the basal inferior wall, defects persist on prone imaging, suggestive of very mild ischemia vs. attenuation artifact, and no evidence of ischemia along the known obstructive disease 80-90% at the diagonal branch 1 territory. Patient presents to [facility] on 2/5/24 for elective LHC.      s/p LHC via RRA revealing Heavily calcified Diagonal artery 80-90% s/p Balloon Angioplasty; Rotational Atherectomy; Intravascular lithotripsy; Laser Atherectomy and 1 MARA to First Diagonal (MUKUND FRONTIER 2.42i74ct); and 1 MARA to mLAD (MUKUND FRONTIER 3.5x30MM). Procedure performed by Dr. Pillai. Concern for plaque shift during procedure, Integrelin bolus was initiated intra procedure lab.   Patient seen and examined this AM. Patient denies chest pain, chest pressure, shortness of breath, palpitations, dizziness, orthopnea or leg edema.  RRA site dressing removed. Site c/d/i without active bleeding or wrist hematoma. RUE/ Right hand remains acyanotic; warm to touch; motor/sensory function intact; 2+ right radial pulse. I ambulated patient two laps around hallways. Patient denies anginal symptoms during exertion.   TTE: < from: TTE W or WO Ultrasound Enhancing Agent (02.06.24 @ 08:55) >    CONCLUSIONS:   1. Normal biventricular systolic function.   2. No significant valvular disease.   3. 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 Ascending aorta diameter is dilated, measuring 4.00 cm (indexed 2.36 cm/m²).   4. No prior echocardiogram is available for comparison.  < end of copied text >    Vital Signs Last 24 Hrs  T(C): 36.8 (06 Feb 2024 08:26), Max: 36.8 (06 Feb 2024 00:00)  T(F): 98.2 (06 Feb 2024 08:26), Max: 98.3 (06 Feb 2024 00:00)  HR: 83 (06 Feb 2024 08:26) (58 - 83)  BP: 104/65 (06 Feb 2024 08:26) (103/57 - 134/78)  BP(mean): --  ABP: --  ABP(mean): --  RR: 18 (06 Feb 2024 08:26) (17 - 18)  SpO2: 94% (06 Feb 2024 08:26) (94% - 100%)    O2 Parameters below as of 06 Feb 2024 08:26  Patient On (Oxygen Delivery Method): room air    ROS:   General: No fevers/chills. No fatigue  HEENT: No hearing loss. No visual disturbances. No headaches. No epistaxis. patient did report mild bleeding of mouth yesterday after procedure but no further episodes overnight. He denies hemoptysis. Small abrasion noted to left side of inside mouth. Patient denies pain.   Pulmonary: Patient endorses SOB or BAGLEY, No dyspnea. No wheeze.   CV: no chest pain, no chest pressure, no SOB, no BAGLEY, no orthopnea, no leg edema, no dizziness or palpitations overnight  GI: No BRBPR. No melena. No nausea.  : No hematuria.  Neuro: No weakness. No paresthesia. No syncope.      GENERAL: NAD, lying in bed comfortably  HEAD:  Atraumatic, normocephalic  EYES: EOMI, PERRLA, conjunctiva and sclera clear  NECK: Supple, trachea midline, no JVD  HEART: Regular rate and rhythm, no murmurs, rubs, or gallops  LUNGS:  Clear to auscultation bilaterally, no crackles, wheezing, or rhonchi  ABDOMEN: Soft, nontender, nondistended, +BS  EXTREMITIES: 2+ peripheral pulses bilaterally. No clubbing, cyanosis, or edema. RUE/ Right hand remains acyanotic; warm to touch; motor/sensory function intact; 2+ right radial pulse.   NERVOUS SYSTEM:  A&Ox3, moving all extremities, no focal deficits   SKIN: No rashes or lesions      PLAN:  1. CAD:  -Formal cath report pending  -Post procedure management/monitoring per protocol  -Access site precautions discussed with patient  -Labs and EKG in am reviewed  -Repeat ECG if any clinical indication or change on tele  -Continue current medical therapy  -Dual anti platelet therapy with aspirin/plavix   -Cont statin therapy with Lipitor 40mg po qHS   -Educated regarding strict adherence with DAPT   -Educated regarding post procedure management and care  -Discussed the importance of RF modification  -Cardiac rehab info provided/referral and communication to cardiac rehab completed  -F/U outpt in 1-2 weeks with Cardiologist Dr. Thompson  -Outpatient appt made for patient Friday 2/9 with Gemini PERES in [employer] office  -DISPO:  Plan for D/C today if remains HDS, ECG and labs in am stable and without complications and TTE stable.     2. HLD:  -Continue atorvastatin 40mg PO QHS  Continue with your cholesterol medications. Eat a heart healthy diet that is low in saturated fats and salt, and includes whole grains, fruits, vegetables and lean protein; exercise regularly (consult with your physician or cardiologist first); maintain a heart healthy weight; if you smoke - quit (A resource to help you stop smoking is the [facility] for Tobacco Control – phone number [phone].). Continue to follow with your primary physician or cardiologist.  Seek medical help for dizziness, Lightheadedness, Blurry vision, Headache, Chest pain, Shortness of breath    3. DM:  -Ok to continue Farxiga as previously prescribed.  -Please hold Metformin x 48 hours. Ok to resume Thursday 2/8 AM  With a diagnosis of diabetes comes a strict diet regimen to help control blood sugars by watching carbohydrate consumption. Carbohydrates, such as starches, fruits, dairy and starchy vegetables, is the food group that affects glucose levels in the body. Carefully monitoring carbohydrate intake is a main component of the diabetic diet; eating three meals each day that are roughly equivalent in size can help control blood sugars. A registered dietitian can help you plan a diet customized to your individual needs.         74y/o male never smoker with history of DM2, HTN, HLD, GERD, and BPH, who was c/o of mid chest discomfort which he describes as "stuffiness" and SOB when climbing stairs. He had a CCTA with FFR which showed significant stenosis in the proximal D1 and the mid to distal circumflex. HE had a LHC which showed 80% stenosis of the mLCX which was treated with a SYNERGY XD 2.5 X 16MM MARA and a SYNERGY XD 2.5 X 28MM MARA, 75% stenosis of the dLCX which was treated with a SYNERGY XD 2.5 X 16MM MARA, with residual 80-90% long area of ostial/proximal/mid D1 disease, and moderate RCA disease. He felt better immediately after the PCI but has began having return of the chest "stuffiness" and BAGLEY. He had a nuclear stress test on 9/12/2023 which showed a very small, very mild reversible perfusion defect at the basal inferior wall, defects persist on prone imaging, suggestive of very mild ischemia vs. attenuation artifact, and no evidence of ischemia along the known obstructive disease 80-90% at the diagonal branch 1 territory. Patient presents to [facility] on 2/5/24 for elective LHC.      s/p LHC via RRA revealing Heavily calcified Diagonal artery 80-90% s/p Balloon Angioplasty; Rotational Atherectomy; Intravascular lithotripsy; Laser Atherectomy and 1 MARA to First Diagonal (MUKUND FRONTIER 2.33i98oo); and 1 MARA to mLAD (MUKUND FRONTIER 3.5x30MM). Procedure performed by Dr. Pillai. Concern for plaque shift during procedure, Integrelin bolus was initiated intra procedure lab.   Patient seen and examined this AM. Patient denies chest pain, chest pressure, shortness of breath, palpitations, dizziness, orthopnea or leg edema.  RRA site dressing removed. Site c/d/i without active bleeding or wrist hematoma. RUE/ Right hand remains acyanotic; warm to touch; motor/sensory function intact; 2+ right radial pulse. I ambulated patient two laps around hallways. Patient denies anginal symptoms during exertion.   TTE: < from: TTE W or WO Ultrasound Enhancing Agent (02.06.24 @ 08:55) >    CONCLUSIONS:   1. Normal biventricular systolic function.   2. No significant valvular disease.   3. 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 Ascending aorta diameter is dilated, measuring 4.00 cm (indexed 2.36 cm/m²).   4. No prior echocardiogram is available for comparison.  < end of copied text >    Vital Signs Last 24 Hrs  T(C): 36.8 (06 Feb 2024 08:26), Max: 36.8 (06 Feb 2024 00:00)  T(F): 98.2 (06 Feb 2024 08:26), Max: 98.3 (06 Feb 2024 00:00)  HR: 83 (06 Feb 2024 08:26) (58 - 83)  BP: 104/65 (06 Feb 2024 08:26) (103/57 - 134/78)  BP(mean): --  ABP: --  ABP(mean): --  RR: 18 (06 Feb 2024 08:26) (17 - 18)  SpO2: 94% (06 Feb 2024 08:26) (94% - 100%)    O2 Parameters below as of 06 Feb 2024 08:26  Patient On (Oxygen Delivery Method): room air    ROS:   General: No fevers/chills. No fatigue  HEENT: No hearing loss. No visual disturbances. No headaches. No epistaxis. patient did report mild bleeding of mouth yesterday after procedure but no further episodes overnight. He denies hemoptysis. Small abrasion noted to left side of inside mouth. Patient denies pain.   Pulmonary: Patient endorses SOB or BAGLEY, No dyspnea. No wheeze.   CV: no chest pain, no chest pressure, no SOB, no BAGLEY, no orthopnea, no leg edema, no dizziness or palpitations overnight  GI: No BRBPR. No melena. No nausea.  : No hematuria.  Neuro: No weakness. No paresthesia. No syncope.      GENERAL: NAD, lying in bed comfortably  HEAD:  Atraumatic, normocephalic  EYES: EOMI, PERRLA, conjunctiva and sclera clear  NECK: Supple, trachea midline, no JVD  HEART: Regular rate and rhythm, no murmurs, rubs, or gallops  LUNGS:  Clear to auscultation bilaterally, no crackles, wheezing, or rhonchi  ABDOMEN: Soft, nontender, nondistended, +BS  EXTREMITIES: 2+ peripheral pulses bilaterally. No clubbing, cyanosis, or edema. RUE/ Right hand remains acyanotic; warm to touch; motor/sensory function intact; 2+ right radial pulse.   NERVOUS SYSTEM:  A&Ox3, moving all extremities, no focal deficits   SKIN: No rashes or lesions      PLAN:  1. CAD:  -Formal cath report pending  -Post procedure management/monitoring per protocol  -Access site precautions discussed with patient  -Labs and EKG in am reviewed  -Repeat ECG if any clinical indication or change on tele  -Continue current medical therapy  -Dual anti platelet therapy with aspirin/plavix   -Cont statin therapy with Lipitor 40mg po qHS   -Educated regarding strict adherence with DAPT   -Educated regarding post procedure management and care  -Discussed the importance of RF modification  -Cardiac rehab info provided/referral and communication to cardiac rehab completed  -F/U outpt in 1-2 weeks with Cardiologist Dr. Thompson  -Outpatient appt made for patient Friday 2/9 with Gemini PERES in [place] office  -DISPO:  Plan for D/C today if remains HDS, ECG and labs in am stable and without complications and TTE stable.     2. HLD:  -Continue atorvastatin 40mg PO QHS  Continue with your cholesterol medications. Eat a heart healthy diet that is low in saturated fats and salt, and includes whole grains, fruits, vegetables and lean protein; exercise regularly (consult with your physician or cardiologist first); maintain a heart healthy weight; if you smoke - quit (A resource to help you stop smoking is the [facility] for Tobacco Control – phone number [phone].). Continue to follow with your primary physician or cardiologist.  Seek medical help for dizziness, Lightheadedness, Blurry vision, Headache, Chest pain, Shortness of breath    3. DM:  -Ok to continue Farxiga as previously prescribed.  -Please hold Metformin x 48 hours. Ok to resume Thursday 2/8 AM  With a diagnosis of diabetes comes a strict diet regimen to help control blood sugars by watching carbohydrate consumption. Carbohydrates, such as starches, fruits, dairy and starchy vegetables, is the food group that affects glucose levels in the body. Carefully monitoring carbohydrate intake is a main component of the diabetic diet; eating three meals each day that are roughly equivalent in size can help control blood sugars. A registered dietitian can help you plan a diet customized to your individual needs.    MANDARIN  BALBINA ID [id number] USED FOR  FOR PHYSICAL EXAM; REVIEW OF SYSTEMS AND FOR ALL DISCHARGE INSTRUCTIONS; follow up instructions; diet instructions; and access site precautions. DAUGHTER present at bedside with wife

## 2024-02-06 NOTE — DISCHARGE NOTE PROVIDER - NSDCCPTREATMENT_GEN_ALL_CORE_FT
PRINCIPAL PROCEDURE  Procedure: Left heart cardiac cath  Findings and Treatment: -You had a cardiac catheterization with Dr. Pillai today on 2/5/24. He placed 1 stent to your diagonal artery and 1 stent to your left anterior descending artery.. Dr. Pillai accessed your right radial artery during the procedure. That is the artery in your right wrist.  -Please continue to monitor your right wrist when you go home. If you develop any bleeding, lay down where you are and apply direct firm pressure until the bleeding stops. If the bleeding is excessive, please call 911.   -If heavy bleeding or large lumps form, hold pressure at the spot and come to the Emergency Room.  -No submerging the arm in water for 48 hours. This includes hot tubs, swimming pools and jacuzzis.  You may start showering today. Prior to showering, remove dressing from right wrist. Shower with warm water and soap. Pat dry with towel. You may place a bandaid over the site. change the bandaid every day.   -Restricted use with no heavy lifting of affected arm for 5 days. No heavy lifting greater than 10lbs.  -You may walk indoors/ outdoors as tolerated. No strenuous exercise, gym, sports or heavy lifting x5 days.  -Please return to nearest ED if you develop any fever, chills, drainage from the incision site, or any change of temperature, color, sensation of the affected extremity.  -Call your doctor for any bleeding, swelling, loss of sensation in the hand or fingers, or fingers turning blue.  -Please return to nearest ED if you develop any chest pain, chest pressure, shortness of breath, jaw pain, pain radiating down the arm, palpitations, dizziness, palpitations, abdominal complaints including abdominal pain, nausea and vomiting.   -Please follow up with your cardiologist in 1-2 weeks     PRINCIPAL PROCEDURE  Procedure: Left heart cardiac cath  Findings and Treatment: -You had a cardiac catheterization with Dr. Pillai today on 2/5/24. He placed 1 stent to your diagonal artery and 1 stent to your left anterior descending artery.. Dr. Pillai accessed your right radial artery during the procedure. That is the artery in your right wrist.  -Please continue to monitor your right wrist when you go home. If you develop any bleeding, lay down where you are and apply direct firm pressure until the bleeding stops. If the bleeding is excessive, please call 911.   -If heavy bleeding or large lumps form, hold pressure at the spot and come to the Emergency Room.  -No submerging the arm in water for 48 hours. This includes hot tubs, swimming pools and jacuzzis.  You may start showering today. Prior to showering, remove dressing from right wrist. Shower with warm water and soap. Pat dry with towel. You may place a bandaid over the site. change the bandaid every day.   -Restricted use with no heavy lifting of affected arm for 5 days. No heavy lifting greater than 10lbs.  -You may walk indoors/ outdoors as tolerated. No strenuous exercise, gym, sports or heavy lifting x5 days.  -Please return to nearest ED if you develop any fever, chills, drainage from the incision site, or any change of temperature, color, sensation of the affected extremity.  -Call your doctor for any bleeding, swelling, loss of sensation in the hand or fingers, or fingers turning blue.  -Please return to nearest ED if you develop any chest pain, chest pressure, shortness of breath, jaw pain, pain radiating down the arm, palpitations, dizziness, palpitations, abdominal complaints including abdominal pain, nausea and vomiting.   -Please follow up with your cardiologist in 1-2 weeks  -You had radiation during your procedure. Continue to monitor your skin for any redness or hair loss. Please contact Cardiologist's office if you experience this,.

## 2024-02-06 NOTE — DISCHARGE NOTE NURSING/CASE MANAGEMENT/SOCIAL WORK - NSDCPEFALRISK_GEN_ALL_CORE
For information on Fall & Injury Prevention, visit: [email or web address] OR  [email or web address] OR  https://www.cdc.gov/steadi/patient.html

## 2024-02-06 NOTE — DISCHARGE NOTE PROVIDER - CARE PROVIDER_API CALL
Drew Thompson  Cardiovascular Disease  [address] [facility], [address] [address]  Phone: [phone]  Fax: [phone]  Established Patient  Follow Up Time: 1 week

## 2024-02-06 NOTE — ASU PATIENT PROFILE, ADULT - FALL HARM RISK - RISK INTERVENTIONS

## 2024-02-06 NOTE — DISCHARGE NOTE PROVIDER - NSDCCPCAREPLAN_GEN_ALL_CORE_FT
PRINCIPAL DISCHARGE DIAGNOSIS  Diagnosis: CAD (coronary artery disease)  Assessment and Plan of Treatment: Coronary artery disease is the buildup of plaque in the arteries that supply oxygen-rich blood to your heart. Plaque causes a narrowing or blockage that could result in a heart attack. Symptoms include chest pain or discomfort, shortness of breath, dizziness, palpitations, fatigue or reduced exercise tolerance. .  Go to the ED with any acute onset of chest pain, palpitations, shortness of breath or dizziness. Do NOT miss a dose or stop taking your Aspirin and Plavix (clopidogrel), these medications keep your stent open and prevent a heart attack. If anyone tells you to stop these medications, speak to your cardiologist immediately.  Managing risk factors will help keep your stent open and prevent future blockages, risk factors may include: high blood pressure, high cholesterol, diabetes, obesity, sedentary life style and smoking.    Your diet should be low in fat, cholesterol, salt and carbohydrates, increase fruits (caution if diabetic), vegetables and whole grains/fiber rich foods.   Take all your cardiac  medications as prescribed.    Exercise is a very important factor in heart health. Once your post procedure restrictions have passed, you should engage in heart healthy, aerobic exercise. Be sure to have clearance from your cardiologist. Cardiac rehab programs could be extremely beneficial and your cardiologist could help set this up.   Follow up with your cardiologist within 1-2 weeks after your procedure.   Call your cardiologist or our unit ([phone]) with any questions or concerns that may arise.      SECONDARY DISCHARGE DIAGNOSES  Diagnosis: HLD (hyperlipidemia)  Assessment and Plan of Treatment: -Continue atorvastatin 40mg PO QHS  Continue with your cholesterol medications. Eat a heart healthy diet that is low in saturated fats and salt, and includes whole grains, fruits, vegetables and lean protein; exercise regularly (consult with your physician or cardiologist first); maintain a heart healthy weight; if you smoke - quit (A resource to help you stop smoking is the [facility] – phone number [phone].). Continue to follow with your primary physician or cardiologist.  Seek medical help for dizziness, Lightheadedness, Blurry vision, Headache, Chest pain, Shortness of breath      Diagnosis: DM (diabetes mellitus)  Assessment and Plan of Treatment: -Continue Farxiga as directed  -PLEASE HOLD METFORMIN x 48 HOURS. YOU CAN RESUME on Thursday 2/8 AM  With a diagnosis of diabetes comes a strict diet regimen to help control blood sugars by watching carbohydrate consumption. Carbohydrates, such as starches, fruits, dairy and starchy vegetables, is the food group that affects glucose levels in the body. Carefully monitoring carbohydrate intake is a main component of the diabetic diet; eating three meals each day that are roughly equivalent in size can help control blood sugars. A registered dietitian can help you plan a diet customized to your individual needs.       PRINCIPAL DISCHARGE DIAGNOSIS  Diagnosis: CAD (coronary artery disease)  Assessment and Plan of Treatment: Coronary artery disease is the buildup of plaque in the arteries that supply oxygen-rich blood to your heart. Plaque causes a narrowing or blockage that could result in a heart attack. Symptoms include chest pain or discomfort, shortness of breath, dizziness, palpitations, fatigue or reduced exercise tolerance. .  Go to the ED with any acute onset of chest pain, palpitations, shortness of breath or dizziness. Do NOT miss a dose or stop taking your Aspirin and Plavix (clopidogrel), these medications keep your stent open and prevent a heart attack. If anyone tells you to stop these medications, speak to your cardiologist immediately.  Managing risk factors will help keep your stent open and prevent future blockages, risk factors may include: high blood pressure, high cholesterol, diabetes, obesity, sedentary life style and smoking.    Your diet should be low in fat, cholesterol, salt and carbohydrates, increase fruits (caution if diabetic), vegetables and whole grains/fiber rich foods.   Take all your cardiac  medications as prescribed.    Exercise is a very important factor in heart health. Once your post procedure restrictions have passed, you should engage in heart healthy, aerobic exercise. Be sure to have clearance from your cardiologist. Cardiac rehab programs could be extremely beneficial and your cardiologist could help set this up.   Follow up with your cardiologist within 1-2 weeks after your procedure.  We made an appointment for you to follow up with Dr. Pillai/ Dr. Thompson Nurse Practitioner Crystal on Friday 2/9 at 11 AM in [facility] ([address] [place] [address])  Call your cardiologist or our unit ([phone]) with any questions or concerns that may arise.      SECONDARY DISCHARGE DIAGNOSES  Diagnosis: HLD (hyperlipidemia)  Assessment and Plan of Treatment: -Continue atorvastatin 40mg PO QHS  Continue with your cholesterol medications. Eat a heart healthy diet that is low in saturated fats and salt, and includes whole grains, fruits, vegetables and lean protein; exercise regularly (consult with your physician or cardiologist first); maintain a heart healthy weight; if you smoke - quit (A resource to help you stop smoking is the [facility] Tobacco [facility] – phone number [phone].). Continue to follow with your primary physician or cardiologist.  Seek medical help for dizziness, Lightheadedness, Blurry vision, Headache, Chest pain, Shortness of breath      Diagnosis: DM (diabetes mellitus)  Assessment and Plan of Treatment: -Continue Farxiga as directed  -PLEASE HOLD METFORMIN x 48 HOURS. YOU CAN RESUME on Thursday 2/8 AM  With a diagnosis of diabetes comes a strict diet regimen to help control blood sugars by watching carbohydrate consumption. Carbohydrates, such as starches, fruits, dairy and starchy vegetables, is the food group that affects glucose levels in the body. Carefully monitoring carbohydrate intake is a main component of the diabetic diet; eating three meals each day that are roughly equivalent in size can help control blood sugars. A registered dietitian can help you plan a diet customized to your individual needs.

## 2024-02-06 NOTE — DISCHARGE NOTE PROVIDER - NSDCMRMEDTOKEN_GEN_ALL_CORE_FT
aspirin 81 mg oral tablet, chewable: 1 tab(s) orally once a day  atorvastatin 40 mg oral tablet: 1 orally once a day  clopidogrel 75 mg oral tablet: 1 tab(s) orally once a day  Farxiga 5 mg oral tablet: 1 orally once a day  metFORMIN 1000 mg oral tablet: 1 tablet orally 2 times a day  pantoprazole 40 mg oral delayed release tablet: 1 tab(s) orally once a day  ranolazine 1000 mg oral tablet, extended release: 1 tab(s) orally 2 times a day  tamsulosin 0.4 mg oral capsule: 1 orally 2 times a day  zinc (as acetate) 50 mg oral capsule: 1 cap(s) orally once a day   aspirin 81 mg oral tablet, chewable: 1 tab(s) orally once a day  atorvastatin 40 mg oral tablet: 1 orally once a day  clopidogrel 75 mg oral tablet: 1 tab(s) orally once a day  Farxiga 5 mg oral tablet: 1 orally once a day  metFORMIN 1000 mg oral tablet: 1 tablet orally 2 times a day PLEASE HOLD x 48 HOURS. YOU MAY RESUME ON Thursday 2/8 AM  pantoprazole 40 mg oral delayed release tablet: 1 tab(s) orally once a day  ranolazine 1000 mg oral tablet, extended release: 1 tab(s) orally 2 times a day  tamsulosin 0.4 mg oral capsule: 1 orally 2 times a day  zinc (as acetate) 50 mg oral capsule: 1 cap(s) orally once a day

## 2024-02-06 NOTE — DISCHARGE NOTE NURSING/CASE MANAGEMENT/SOCIAL WORK - PATIENT PORTAL LINK FT
You can access the FollowMyHealth Patient Portal offered by [facility] by registering at the following website: [email or web address]. By joining [facility]’s FollowMyHealth portal, you will also be able to view your health information using other applications (apps) compatible with our system.

## 2024-02-09 ENCOUNTER — APPOINTMENT (OUTPATIENT)
Dept: CARDIOLOGY | Facility: CLINIC | Age: 74
End: 2024-02-09
Payer: MEDICAID

## 2024-02-09 VITALS
OXYGEN SATURATION: 96 % | SYSTOLIC BLOOD PRESSURE: 118 MMHG | HEIGHT: 66 IN | HEART RATE: 82 BPM | BODY MASS INDEX: 22.5 KG/M2 | WEIGHT: 140 LBS | DIASTOLIC BLOOD PRESSURE: 74 MMHG

## 2024-02-09 DIAGNOSIS — E78.5 HYPERLIPIDEMIA, UNSPECIFIED: ICD-10-CM

## 2024-02-09 DIAGNOSIS — I10 ESSENTIAL (PRIMARY) HYPERTENSION: ICD-10-CM

## 2024-02-09 PROBLEM — K21.9 GASTRO-ESOPHAGEAL REFLUX DISEASE WITHOUT ESOPHAGITIS: Chronic | Status: ACTIVE | Noted: 2024-02-01

## 2024-02-09 PROCEDURE — 99215 OFFICE O/P EST HI 40 MIN: CPT | Mod: 25

## 2024-02-09 PROCEDURE — 93000 ELECTROCARDIOGRAM COMPLETE: CPT

## 2024-03-29 ENCOUNTER — NON-APPOINTMENT (OUTPATIENT)
Age: 74
End: 2024-03-29

## 2024-04-16 ENCOUNTER — NON-APPOINTMENT (OUTPATIENT)
Age: 74
End: 2024-04-16

## 2024-04-16 ENCOUNTER — APPOINTMENT (OUTPATIENT)
Dept: CARDIOLOGY | Facility: CLINIC | Age: 74
End: 2024-04-16
Payer: MEDICAID

## 2024-04-16 VITALS
WEIGHT: 136 LBS | TEMPERATURE: 98.5 F | HEART RATE: 107 BPM | SYSTOLIC BLOOD PRESSURE: 126 MMHG | BODY MASS INDEX: 21.86 KG/M2 | HEIGHT: 66 IN | DIASTOLIC BLOOD PRESSURE: 82 MMHG | OXYGEN SATURATION: 95 %

## 2024-04-16 DIAGNOSIS — R06.02 SHORTNESS OF BREATH: ICD-10-CM

## 2024-04-16 DIAGNOSIS — Z95.5 PRESENCE OF CORONARY ANGIOPLASTY IMPLANT AND GRAFT: ICD-10-CM

## 2024-04-16 DIAGNOSIS — I25.10 ATHEROSCLEROTIC HEART DISEASE OF NATIVE CORONARY ARTERY W/OUT ANGINA PECTORIS: ICD-10-CM

## 2024-04-16 DIAGNOSIS — R07.89 OTHER CHEST PAIN: ICD-10-CM

## 2024-04-16 PROCEDURE — 99215 OFFICE O/P EST HI 40 MIN: CPT

## 2024-04-16 PROCEDURE — G2211 COMPLEX E/M VISIT ADD ON: CPT | Mod: NC,1L

## 2024-04-16 RX ORDER — RANOLAZINE 1000 MG/1
1000 TABLET, EXTENDED RELEASE ORAL
Qty: 180 | Refills: 3 | Status: ACTIVE | COMMUNITY
Start: 2023-10-06 | End: 1900-01-01

## 2024-04-16 RX ORDER — ATORVASTATIN CALCIUM 40 MG/1
40 TABLET, FILM COATED ORAL
Qty: 1 | Refills: 3 | Status: ACTIVE | COMMUNITY
Start: 2023-05-26 | End: 1900-01-01

## 2024-04-16 RX ORDER — ASPIRIN 81 MG/1
81 TABLET ORAL DAILY
Qty: 90 | Refills: 3 | Status: ACTIVE | COMMUNITY
Start: 2023-08-21 | End: 1900-01-01

## 2024-04-16 RX ORDER — PANTOPRAZOLE 40 MG/1
40 TABLET, DELAYED RELEASE ORAL
Qty: 90 | Refills: 3 | Status: ACTIVE | COMMUNITY
Start: 2023-08-21 | End: 1900-01-01

## 2024-04-16 RX ORDER — CLOPIDOGREL BISULFATE 75 MG/1
75 TABLET, FILM COATED ORAL
Qty: 90 | Refills: 3 | Status: ACTIVE | COMMUNITY
Start: 1900-01-01 | End: 1900-01-01

## 2024-04-16 NOTE — HISTORY OF PRESENT ILLNESS
[FreeTextEntry1] : 73M Mandarin-speaking h/o thalassemia -trait, HTN, DM2, intermittent L-sided chest discomfort, presented for cardiology evaluation 4/2023, TTE 5/2023 LVE 60-65%,  aortic root at sinuses of Valsalva  moderately dilated 4.6 cm, Asc aorta 3.9 cm,   Ascending aorta 3.9 cm. CCTA showed multivessel CAD, referred for [facility] which was performed 7/31/2023 revealed LAD with mild diffuse disease, ostial D1 80-90%, mid Cx 80% distal 75% heavily calcified disease, RCA heavily calcified prox diffuse 20-30%, mid 40-50%, distal 30-40%; DESx 2 to mid and distal Cx, given diffusely long ostial diagonal disease medically managed, last visit 8/4/2023 with resolution of chest pain post PCI, returned for follow up in 8/21/23 with positional dizziness/palpitation suspect orthostasis reduced losartan to 25mg and exercise nuclear stress test with very small mild reversible defect along the basal inferior wall but without ischemia along the diagonal territory, remains dizzy despite reduced tamsulosin from BID to once daily, interval discontinued losartan, dizziness resolved, Ranexa added on 10/2023, last seen 12/12/2023 with persistent exertional dyspnea and chest discomfort, Ranexa increased to 1gm BID, prior visit with refractory angina underwent repeat cath had orbital atherectomy to D1 with MARA and mLAD 70% s/p MARA on 2/5/24, returns for follow up.  Patient presents with his daughter for the visit, has recent seasonal allergy taking antihistamines, he went to [facility] in March then noted recurrence of chest pressure but milder than before, previously rated 10/10 but now about 2-4 out of 10, continues to have exertional dyspnea walking stairs unchanged since multiple PCIs, denies prior lung condition. Denies any symptoms walking on flat ground can walk for about up to one hour.  Recent LDL 28    2/9/2024 Patient here for follow up. Accompanied by his daughter who is providing translation. He is s/p PCI with MARA to LAD and D1 2/5/24. He state she is feeling much better. Denies chest pain, SOB, palpitations, near syncope or syncope. Denies bleeding episodes. Reports compliance with medications. Denies bleeding on DAPT. He is walking alot without symptoms. Plans to travel to [place] and then [facility] next week until mid April.   1/18/24:  He is accompanied by his daughter who provides translation / interpretation. At last visit pts pain scale was about 30% of what it was prior to stent, Now 50-60% of what it was prior to stent in 7/2023, as the day progresses the chest discomfort gets worse. He was not feeling well on 1gm Ranexa, cannot describe in what way- cut back down to 500 mg BID 2 weeks ago.  He now reports a feeling of almost constant "chest stuffiness" for the past week, denies cough, not feeling like GERD, reports compliance with Pantoprazole. BAGLEY has improved- he will only feel short of breath with overexertion with a brisk walk. Denies LE edema, palpitations, lightheadedness, syncope. BP has been stable at home, highest readings up to   Prior visit 12/2023 Accompanied by his daughter who provides interpretation. He reports some improvement on Ranexa reporting less frequent episodes and also milder in intensity, but intermittently still experiencing BAGLEY and chest discomfort, with 1 flight of stairs at home, needs to rest for symptoms to resolve. Denies any rest pain pr SOB at rest, Denies SOB, PND, orthopnea, LE edema, palpitations, lightheadedness, syncope. denies any bleeding episodes.    10/6/2023 Patient presents with her daughter for the visit. Still feeling exertional dyspnea with walking and hiking, also intermittent chest pressure has not changed for the past few months, feeling more fatigue with reduced endurance. Denies bleeding with DAPT use. Dizziness resolved off losartan and reduced dose of tamsulosin.    Prior visit 8/2023:  Pt is accompanied by his wife and daughter Philly, daughter provides translation Pt is reporting constant retrosternal 5/10 chest pressure  x 2-3 days., able to continue tile work, no aggravating or alleviating factors, not reproducible to palpation, similar but less severe than prior to PCI. 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 C on 7/31/2023 revealed LAD with mild diffuse disease, D1 80-90%, mid Cx 80% distal 75% heavily calcified disease, RCA heavily calcified prox diffuse 20-30%, mid 40-50%, distal 30-40%; DESx 2 to mid and distal Cx. He is accompanied by his daughter, Maribel, who assisted in Mandarin- English translation today. Reports feeling well. States the discomfort in his chest is not gone since the stents placed. Denies chest pain, SOB at rest, BAGLEY, palpitations, lightheadedness, dizziness, fatigue, syncope, near syncope and LE edema. Denies smoking, excessive alcohol and illicit drug use.      Prior visit 4/2023 Patient presents with his wife/daughter for the visit.  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 Recollect had prior exercise stress test >20yrs ago.    PCP Dr. Pollo Wilder [phone]; fax [phone] Recent lab: A1c 6.6%, LDL 70, Cr. 0.67  Nonsmoker, no alcohol use No CAD or stroke in family Lives in [place], ordinally from [facility]

## 2024-04-16 NOTE — DISCUSSION/SUMMARY
[FreeTextEntry1] : 74M Mandarin-speaking h/o nonsmoker, thalassemia -trait, HTN, DM2, intermittent L-sided chest discomfort, presented for cardiology evaluation 4/2023, TTE 5/2023 LVE 60-65%,  aortic root at sinuses of Valsalva  moderately dilated 4.6 cm, Asc aorta 3.9 cm,   Ascending aorta 3.9 cm. CCTA showed multivessel CAD, referred for [facility] which was performed 7/31/2023 revealed LAD with mild diffuse disease, ostial D1 80-90%, mid Cx 80% distal 75% heavily calcified disease, RCA heavily calcified prox diffuse 20-30%, mid 40-50%, distal 30-40%; DESx 2 to mid and distal Cx, given diffusely long ostial diagonal disease medically managed, last visit 8/4/2023 with resolution of chest pain post PCI, returned for follow up in 8/21/23 with positional dizziness/palpitation suspect orthostasis reduced losartan to 25mg and exercise nuclear stress test with very small mild reversible defect along the basal inferior wall but without ischemia along the diagonal territory, remains dizzy despite reduced tamsulosin from BID to once daily, interval discontinued losartan, dizziness resolved, Ranexa added on 10/2023, last seen 12/12/2023 with persistent exertional dyspnea and chest discomfort, Ranexa increased to 1gm BID, prior visit with refractory angina underwent repeat cath had orbital atherectomy to D1 with MARA and mLAD 70% s/p MARA on 2/5/24, returns for follow up.  Recent recurrence of chest discomfort but milder compared to prior to most recent PCI, continue medical management on Ranexa, unchanged chronic exertional dyspnea since multiple PCIs will refer for cardiac rehab, prior CT chest 2023 with 5mm nodule otherwise unremarkable, obtain CXR and pulmonary eval. for PFT.    Chronic exertional dyspnea -obtain EST before cardiac rehab -await CXR and pulmonary eval.   CAD s/p PCI MARA x 2 LCx on 7/31/2023, D1 and mLAD stents 2/2024 -residual chest pressure continue medical management on Ranexa, already on pantoprazole  -continue long term DAPT use given multiple stents -LDL at goal <50, check Lp(a) on next visit  HTN -normotensive off losartan and on reduced dose of tamsulosin  Dilated aorta- stable. SoV 4.5 cm, ascending aorta 4.0 cm (2/2024), no significant change from TTE 5/2023.  DM2 -on metformin and Farxiga   BPH- on tamsulosin lowered dose due to orthostatic hypotension, 0.4 mg once daily   Follow up in 3 months.

## 2024-04-16 NOTE — CARDIOLOGY SUMMARY
[de-identified] : 4/28/23- Sinus 81, left axis, no ST changes, QTc 404 8/4/2023: SR, nonspecific T abnormality  8/21/2023- Sinus 81, no acute ST T changes QTc 416 10/6/23- Sinus 78, left axis, nonspecific T-wave, QTc 444 4/16/24- Sinus 91, PACs x3, no ST abnormality, QTc 413 [de-identified] : 9/13/23- Exercise myocardial Perfusion: Mildly Abnormal. 2. There is a very small, very mild reversible perfusion defect at the basal inferior wall, defects persist on prone imaging, suggestive of very mild ischemia vs. attenuation artifact. No evidence of ischemia along the known obstructive disease 80-90% at the diagonal branch 1 territory. No TID (ratio 1.03). Continue medical management is appropriate. [de-identified] : 5/18/2023  TTE   CONCLUSIONS: 1. The left ventricular systolic function is normal with an ejection fraction of 63 % by Calabrese's method of disks with an ejection fraction visually estimated at 60 to 65 %. 2. Impaired relaxation pattern. 3. Normal right ventricular cavity size and normal systolic function, estimated PASP 22 mmHg. 4. Redundant and thickened hypermobile septal leaflet of the tricuspid valve. 5. The aortic root at sinuses of Valsalva is moderately dilated 4.6 cm index to BSA 2.7 cm/m2. Ascending aorta 3.9 cm. 6. No pericardial effusion seen. 7. No prior echocardiogram is available for comparison. [de-identified] : 2/5/24 PCI of D1 with orbital atherectomy and MARA, mLAD 70% MARA, patent [id number] LCx stents

## 2024-04-30 ENCOUNTER — APPOINTMENT (OUTPATIENT)
Dept: CARDIOLOGY | Facility: CLINIC | Age: 74
End: 2024-04-30
Payer: MEDICAID

## 2024-04-30 PROCEDURE — 93015 CV STRESS TEST SUPVJ I&R: CPT

## 2024-05-03 ENCOUNTER — TRANSCRIPTION ENCOUNTER (OUTPATIENT)
Age: 74
End: 2024-05-03

## 2024-05-10 ENCOUNTER — APPOINTMENT (OUTPATIENT)
Dept: CARDIOLOGY | Facility: CLINIC | Age: 74
End: 2024-05-10

## 2024-05-10 ENCOUNTER — OUTPATIENT (OUTPATIENT)
Dept: OUTPATIENT SERVICES | Facility: HOSPITAL | Age: 74
LOS: 1 days | End: 2024-05-10
Payer: MEDICAID

## 2024-05-10 ENCOUNTER — APPOINTMENT (OUTPATIENT)
Dept: RADIOLOGY | Facility: CLINIC | Age: 74
End: 2024-05-10
Payer: MEDICAID

## 2024-05-10 DIAGNOSIS — R06.02 SHORTNESS OF BREATH: ICD-10-CM

## 2024-05-10 DIAGNOSIS — Z95.5 PRESENCE OF CORONARY ANGIOPLASTY IMPLANT AND GRAFT: Chronic | ICD-10-CM

## 2024-05-10 DIAGNOSIS — Z98.890 OTHER SPECIFIED POSTPROCEDURAL STATES: Chronic | ICD-10-CM

## 2024-05-10 PROCEDURE — 71046 X-RAY EXAM CHEST 2 VIEWS: CPT

## 2024-05-10 PROCEDURE — 71046 X-RAY EXAM CHEST 2 VIEWS: CPT | Mod: 26

## 2024-07-08 ENCOUNTER — NON-APPOINTMENT (OUTPATIENT)
Age: 74
End: 2024-07-08

## 2024-07-08 ENCOUNTER — APPOINTMENT (OUTPATIENT)
Dept: UROLOGY | Facility: CLINIC | Age: 74
End: 2024-07-08
Payer: MEDICAID

## 2024-07-08 ENCOUNTER — APPOINTMENT (OUTPATIENT)
Dept: DERMATOLOGY | Facility: CLINIC | Age: 74
End: 2024-07-08
Payer: MEDICAID

## 2024-07-08 DIAGNOSIS — N40.0 BENIGN PROSTATIC HYPERPLASIA WITHOUT LOWER URINARY TRACT SYMPMS: ICD-10-CM

## 2024-07-08 DIAGNOSIS — L82.1 OTHER SEBORRHEIC KERATOSIS: ICD-10-CM

## 2024-07-08 PROCEDURE — 99214 OFFICE O/P EST MOD 30 MIN: CPT | Mod: 25

## 2024-07-08 PROCEDURE — 51798 US URINE CAPACITY MEASURE: CPT

## 2024-07-08 PROCEDURE — 99203 OFFICE O/P NEW LOW 30 MIN: CPT

## 2024-07-08 PROCEDURE — G2211 COMPLEX E/M VISIT ADD ON: CPT | Mod: NC

## 2024-07-08 RX ORDER — FINASTERIDE 5 MG/1
5 TABLET, FILM COATED ORAL DAILY
Qty: 90 | Refills: 3 | Status: ACTIVE | COMMUNITY
Start: 2024-07-08 | End: 1900-01-01

## 2024-07-09 ENCOUNTER — NON-APPOINTMENT (OUTPATIENT)
Age: 74
End: 2024-07-09

## 2024-07-09 ENCOUNTER — APPOINTMENT (OUTPATIENT)
Dept: CARDIOLOGY | Facility: CLINIC | Age: 74
End: 2024-07-09
Payer: MEDICAID

## 2024-07-09 VITALS
SYSTOLIC BLOOD PRESSURE: 96 MMHG | HEIGHT: 66 IN | BODY MASS INDEX: 22.02 KG/M2 | WEIGHT: 137 LBS | OXYGEN SATURATION: 98 % | DIASTOLIC BLOOD PRESSURE: 66 MMHG | HEART RATE: 87 BPM

## 2024-07-09 DIAGNOSIS — Z01.810 ENCOUNTER FOR PREPROCEDURAL CARDIOVASCULAR EXAMINATION: ICD-10-CM

## 2024-07-09 DIAGNOSIS — R06.02 SHORTNESS OF BREATH: ICD-10-CM

## 2024-07-09 DIAGNOSIS — I25.10 ATHEROSCLEROTIC HEART DISEASE OF NATIVE CORONARY ARTERY W/OUT ANGINA PECTORIS: ICD-10-CM

## 2024-07-09 DIAGNOSIS — Z95.5 PRESENCE OF CORONARY ANGIOPLASTY IMPLANT AND GRAFT: ICD-10-CM

## 2024-07-09 DIAGNOSIS — K59.00 CONSTIPATION, UNSPECIFIED: ICD-10-CM

## 2024-07-09 LAB
ANION GAP SERPL CALC-SCNC: 18 MMOL/L
APPEARANCE: CLEAR
BACTERIA: NEGATIVE /HPF
BILIRUBIN URINE: NEGATIVE
BLOOD URINE: NEGATIVE
BUN SERPL-MCNC: 22 MG/DL
CALCIUM SERPL-MCNC: 9.7 MG/DL
CAST: 0 /LPF
CHLORIDE SERPL-SCNC: 101 MMOL/L
CO2 SERPL-SCNC: 22 MMOL/L
COLOR: NORMAL
CREAT SERPL-MCNC: 0.86 MG/DL
EGFR: 91 ML/MIN/1.73M2
EPITHELIAL CELLS: 0 /HPF
GLUCOSE QUALITATIVE U: >=1000 MG/DL
GLUCOSE SERPL-MCNC: 112 MG/DL
KETONES URINE: ABNORMAL MG/DL
LEUKOCYTE ESTERASE URINE: NEGATIVE
MICROSCOPIC-UA: NORMAL
NITRITE URINE: NEGATIVE
PH URINE: 5.5
POTASSIUM SERPL-SCNC: 4.6 MMOL/L
PSA FREE FLD-MCNC: 24 %
PSA FREE SERPL-MCNC: 0.7 NG/ML
PSA SERPL-MCNC: 2.96 NG/ML
RED BLOOD CELLS URINE: 0 /HPF
SODIUM SERPL-SCNC: 140 MMOL/L
SPECIFIC GRAVITY URINE: >1.03
UROBILINOGEN URINE: 0.2 MG/DL
WHITE BLOOD CELLS URINE: 0 /HPF

## 2024-07-09 PROCEDURE — 93000 ELECTROCARDIOGRAM COMPLETE: CPT

## 2024-07-09 PROCEDURE — G2211 COMPLEX E/M VISIT ADD ON: CPT | Mod: NC

## 2024-07-09 PROCEDURE — 99214 OFFICE O/P EST MOD 30 MIN: CPT | Mod: 25

## 2024-07-10 LAB — BACTERIA UR CULT: NORMAL

## 2024-07-10 RX ORDER — SODIUM SULFATE, POTASSIUM SULFATE AND MAGNESIUM SULFATE 1.6; 3.13; 17.5 G/177ML; G/177ML; G/177ML
17.5-3.13-1.6 SOLUTION ORAL
Qty: 1 | Refills: 0 | Status: ACTIVE | COMMUNITY
Start: 2024-07-10 | End: 1900-01-01

## 2024-08-19 ENCOUNTER — APPOINTMENT (OUTPATIENT)
Dept: CARDIOLOGY | Facility: CLINIC | Age: 74
End: 2024-08-19
Payer: MEDICAID

## 2024-08-19 PROCEDURE — 93798 PHYS/QHP OP CAR RHAB W/ECG: CPT

## 2024-08-21 ENCOUNTER — APPOINTMENT (OUTPATIENT)
Dept: CARDIOLOGY | Facility: CLINIC | Age: 74
End: 2024-08-21
Payer: MEDICAID

## 2024-08-21 PROCEDURE — 93798 PHYS/QHP OP CAR RHAB W/ECG: CPT

## 2024-08-26 ENCOUNTER — APPOINTMENT (OUTPATIENT)
Dept: CARDIOLOGY | Facility: CLINIC | Age: 74
End: 2024-08-26
Payer: MEDICAID

## 2024-08-26 PROCEDURE — 93798 PHYS/QHP OP CAR RHAB W/ECG: CPT

## 2024-08-28 ENCOUNTER — APPOINTMENT (OUTPATIENT)
Dept: CARDIOLOGY | Facility: CLINIC | Age: 74
End: 2024-08-28
Payer: MEDICAID

## 2024-08-28 PROCEDURE — 93798 PHYS/QHP OP CAR RHAB W/ECG: CPT

## 2024-08-30 LAB — HBA1C MFR BLD HPLC: 7

## 2024-09-04 ENCOUNTER — APPOINTMENT (OUTPATIENT)
Dept: CARDIOLOGY | Facility: CLINIC | Age: 74
End: 2024-09-04
Payer: MEDICAID

## 2024-09-04 PROCEDURE — 93798 PHYS/QHP OP CAR RHAB W/ECG: CPT

## 2024-09-09 ENCOUNTER — APPOINTMENT (OUTPATIENT)
Dept: CARDIOLOGY | Facility: CLINIC | Age: 74
End: 2024-09-09
Payer: MEDICAID

## 2024-09-09 PROCEDURE — 93798 PHYS/QHP OP CAR RHAB W/ECG: CPT

## 2024-09-11 ENCOUNTER — APPOINTMENT (OUTPATIENT)
Dept: CARDIOLOGY | Facility: CLINIC | Age: 74
End: 2024-09-11
Payer: MEDICAID

## 2024-09-11 PROCEDURE — 93798 PHYS/QHP OP CAR RHAB W/ECG: CPT

## 2024-09-16 ENCOUNTER — APPOINTMENT (OUTPATIENT)
Dept: CARDIOLOGY | Facility: CLINIC | Age: 74
End: 2024-09-16
Payer: MEDICAID

## 2024-09-16 PROCEDURE — 93798 PHYS/QHP OP CAR RHAB W/ECG: CPT

## 2024-09-18 ENCOUNTER — APPOINTMENT (OUTPATIENT)
Dept: CARDIOLOGY | Facility: CLINIC | Age: 74
End: 2024-09-18
Payer: MEDICAID

## 2024-09-18 PROCEDURE — 93798 PHYS/QHP OP CAR RHAB W/ECG: CPT

## 2024-09-23 ENCOUNTER — APPOINTMENT (OUTPATIENT)
Dept: CARDIOLOGY | Facility: CLINIC | Age: 74
End: 2024-09-23

## 2024-09-25 ENCOUNTER — APPOINTMENT (OUTPATIENT)
Dept: CARDIOLOGY | Facility: CLINIC | Age: 74
End: 2024-09-25

## 2024-09-30 ENCOUNTER — APPOINTMENT (OUTPATIENT)
Dept: CARDIOLOGY | Facility: CLINIC | Age: 74
End: 2024-09-30
Payer: MEDICAID

## 2024-09-30 PROCEDURE — 93798 PHYS/QHP OP CAR RHAB W/ECG: CPT

## 2024-10-02 ENCOUNTER — APPOINTMENT (OUTPATIENT)
Dept: CARDIOLOGY | Facility: CLINIC | Age: 74
End: 2024-10-02
Payer: MEDICAID

## 2024-10-02 PROCEDURE — 93798 PHYS/QHP OP CAR RHAB W/ECG: CPT

## 2024-10-05 ENCOUNTER — RX RENEWAL (OUTPATIENT)
Age: 74
End: 2024-10-05

## 2024-10-07 ENCOUNTER — APPOINTMENT (OUTPATIENT)
Dept: CARDIOLOGY | Facility: CLINIC | Age: 74
End: 2024-10-07

## 2024-10-09 ENCOUNTER — APPOINTMENT (OUTPATIENT)
Dept: CARDIOLOGY | Facility: CLINIC | Age: 74
End: 2024-10-09

## 2024-10-14 ENCOUNTER — APPOINTMENT (OUTPATIENT)
Dept: CARDIOLOGY | Facility: CLINIC | Age: 74
End: 2024-10-14
Payer: MEDICAID

## 2024-10-14 PROCEDURE — 93798 PHYS/QHP OP CAR RHAB W/ECG: CPT

## 2024-10-16 ENCOUNTER — APPOINTMENT (OUTPATIENT)
Dept: CARDIOLOGY | Facility: CLINIC | Age: 74
End: 2024-10-16
Payer: MEDICAID

## 2024-10-16 PROCEDURE — 93798 PHYS/QHP OP CAR RHAB W/ECG: CPT

## 2024-10-21 ENCOUNTER — APPOINTMENT (OUTPATIENT)
Dept: CARDIOLOGY | Facility: CLINIC | Age: 74
End: 2024-10-21

## 2024-10-23 ENCOUNTER — APPOINTMENT (OUTPATIENT)
Dept: CARDIOLOGY | Facility: CLINIC | Age: 74
End: 2024-10-23

## 2024-10-28 ENCOUNTER — APPOINTMENT (OUTPATIENT)
Dept: CARDIOLOGY | Facility: CLINIC | Age: 74
End: 2024-10-28

## 2024-10-30 ENCOUNTER — APPOINTMENT (OUTPATIENT)
Dept: CARDIOLOGY | Facility: CLINIC | Age: 74
End: 2024-10-30

## 2024-11-04 ENCOUNTER — APPOINTMENT (OUTPATIENT)
Dept: CARDIOLOGY | Facility: CLINIC | Age: 74
End: 2024-11-04

## 2024-11-06 ENCOUNTER — APPOINTMENT (OUTPATIENT)
Dept: CARDIOLOGY | Facility: CLINIC | Age: 74
End: 2024-11-06

## 2024-11-11 ENCOUNTER — APPOINTMENT (OUTPATIENT)
Dept: CARDIOLOGY | Facility: CLINIC | Age: 74
End: 2024-11-11

## 2024-12-20 ENCOUNTER — APPOINTMENT (OUTPATIENT)
Dept: GASTROENTEROLOGY | Facility: HOSPITAL | Age: 74
End: 2024-12-20

## 2025-02-24 ENCOUNTER — APPOINTMENT (OUTPATIENT)
Dept: CARDIOLOGY | Facility: CLINIC | Age: 75
End: 2025-02-24
Payer: MEDICARE

## 2025-02-24 ENCOUNTER — NON-APPOINTMENT (OUTPATIENT)
Age: 75
End: 2025-02-24

## 2025-02-24 VITALS
DIASTOLIC BLOOD PRESSURE: 54 MMHG | BODY MASS INDEX: 22.02 KG/M2 | HEIGHT: 66 IN | WEIGHT: 137 LBS | OXYGEN SATURATION: 97 % | SYSTOLIC BLOOD PRESSURE: 107 MMHG | HEART RATE: 79 BPM

## 2025-02-24 DIAGNOSIS — R06.02 SHORTNESS OF BREATH: ICD-10-CM

## 2025-02-24 DIAGNOSIS — Z95.5 PRESENCE OF CORONARY ANGIOPLASTY IMPLANT AND GRAFT: ICD-10-CM

## 2025-02-24 PROCEDURE — 99204 OFFICE O/P NEW MOD 45 MIN: CPT

## 2025-02-24 PROCEDURE — 93000 ELECTROCARDIOGRAM COMPLETE: CPT

## 2025-02-24 PROCEDURE — G2211 COMPLEX E/M VISIT ADD ON: CPT

## 2025-03-03 ENCOUNTER — APPOINTMENT (OUTPATIENT)
Dept: UROLOGY | Facility: CLINIC | Age: 75
End: 2025-03-03
Payer: MEDICAID

## 2025-03-03 VITALS — HEART RATE: 96 BPM | SYSTOLIC BLOOD PRESSURE: 137 MMHG | DIASTOLIC BLOOD PRESSURE: 74 MMHG

## 2025-03-03 DIAGNOSIS — N40.0 BENIGN PROSTATIC HYPERPLASIA WITHOUT LOWER URINARY TRACT SYMPMS: ICD-10-CM

## 2025-03-03 DIAGNOSIS — R07.89 OTHER CHEST PAIN: ICD-10-CM

## 2025-03-03 DIAGNOSIS — I25.10 ATHEROSCLEROTIC HEART DISEASE OF NATIVE CORONARY ARTERY W/OUT ANGINA PECTORIS: ICD-10-CM

## 2025-03-03 PROCEDURE — 99214 OFFICE O/P EST MOD 30 MIN: CPT | Mod: 25

## 2025-03-03 PROCEDURE — 51798 US URINE CAPACITY MEASURE: CPT

## 2025-03-13 ENCOUNTER — APPOINTMENT (OUTPATIENT)
Dept: CARDIOLOGY | Facility: CLINIC | Age: 75
End: 2025-03-13

## 2025-03-17 ENCOUNTER — APPOINTMENT (OUTPATIENT)
Dept: CARDIOLOGY | Facility: CLINIC | Age: 75
End: 2025-03-17
Payer: MEDICARE

## 2025-03-17 PROCEDURE — 93306 TTE W/DOPPLER COMPLETE: CPT

## 2025-03-17 PROCEDURE — 93015 CV STRESS TEST SUPVJ I&R: CPT

## 2025-03-17 PROCEDURE — A9502: CPT

## 2025-03-17 PROCEDURE — 78452 HT MUSCLE IMAGE SPECT MULT: CPT

## 2025-03-27 ENCOUNTER — APPOINTMENT (OUTPATIENT)
Dept: UROLOGY | Facility: CLINIC | Age: 75
End: 2025-03-27
Payer: MEDICARE

## 2025-03-27 ENCOUNTER — OUTPATIENT (OUTPATIENT)
Dept: OUTPATIENT SERVICES | Facility: HOSPITAL | Age: 75
LOS: 1 days | End: 2025-03-27
Payer: MEDICARE

## 2025-03-27 VITALS
SYSTOLIC BLOOD PRESSURE: 120 MMHG | HEART RATE: 91 BPM | OXYGEN SATURATION: 96 % | DIASTOLIC BLOOD PRESSURE: 70 MMHG | TEMPERATURE: 98.9 F

## 2025-03-27 DIAGNOSIS — Z95.5 PRESENCE OF CORONARY ANGIOPLASTY IMPLANT AND GRAFT: Chronic | ICD-10-CM

## 2025-03-27 DIAGNOSIS — Z98.890 OTHER SPECIFIED POSTPROCEDURAL STATES: Chronic | ICD-10-CM

## 2025-03-27 DIAGNOSIS — R35.0 FREQUENCY OF MICTURITION: ICD-10-CM

## 2025-03-27 PROCEDURE — 99204 OFFICE O/P NEW MOD 45 MIN: CPT | Mod: 25

## 2025-03-27 PROCEDURE — 51728 CYSTOMETROGRAM W/VP: CPT | Mod: 26

## 2025-03-27 PROCEDURE — 51741 ELECTRO-UROFLOWMETRY FIRST: CPT

## 2025-03-27 PROCEDURE — 51797 INTRAABDOMINAL PRESSURE TEST: CPT | Mod: 26

## 2025-03-27 PROCEDURE — 52000 CYSTOURETHROSCOPY: CPT

## 2025-03-27 PROCEDURE — 51784 ANAL/URINARY MUSCLE STUDY: CPT | Mod: 26

## 2025-03-27 PROCEDURE — 51741 ELECTRO-UROFLOWMETRY FIRST: CPT | Mod: 26

## 2025-03-27 PROCEDURE — 51797 INTRAABDOMINAL PRESSURE TEST: CPT

## 2025-03-27 PROCEDURE — 51728 CYSTOMETROGRAM W/VP: CPT

## 2025-03-27 PROCEDURE — 51784 ANAL/URINARY MUSCLE STUDY: CPT

## 2025-03-28 DIAGNOSIS — N40.0 BENIGN PROSTATIC HYPERPLASIA WITHOUT LOWER URINARY TRACT SYMPTOMS: ICD-10-CM

## 2025-04-09 ENCOUNTER — LABORATORY RESULT (OUTPATIENT)
Age: 75
End: 2025-04-09

## 2025-04-09 ENCOUNTER — APPOINTMENT (OUTPATIENT)
Dept: PULMONOLOGY | Facility: CLINIC | Age: 75
End: 2025-04-09
Payer: MEDICARE

## 2025-04-09 VITALS
HEART RATE: 86 BPM | SYSTOLIC BLOOD PRESSURE: 98 MMHG | BODY MASS INDEX: 22.88 KG/M2 | HEIGHT: 65.25 IN | WEIGHT: 139 LBS | OXYGEN SATURATION: 97 % | DIASTOLIC BLOOD PRESSURE: 62 MMHG | RESPIRATION RATE: 16 BRPM

## 2025-04-09 DIAGNOSIS — R06.02 SHORTNESS OF BREATH: ICD-10-CM

## 2025-04-09 DIAGNOSIS — Z91.09 OTHER ALLERGY STATUS, OTHER THAN TO DRUGS AND BIOLOGICAL SUBSTANCES: ICD-10-CM

## 2025-04-09 DIAGNOSIS — R91.1 SOLITARY PULMONARY NODULE: ICD-10-CM

## 2025-04-09 PROCEDURE — G2211 COMPLEX E/M VISIT ADD ON: CPT

## 2025-04-09 PROCEDURE — 99204 OFFICE O/P NEW MOD 45 MIN: CPT

## 2025-04-11 LAB
A ALTERNATA IGE QN: <0.1 KUA/L
A FUMIGATUS IGE QN: <0.1 KUA/L
BASOPHILS # BLD AUTO: 0.05 K/UL
BASOPHILS NFR BLD AUTO: 0.7 %
BERMUDA GRASS IGE QN: <0.1 KUA/L
BOXELDER IGE QN: <0.1 KUA/L
C HERBARUM IGE QN: <0.1 KUA/L
CALIF WALNUT IGE QN: <0.1 KUA/L
CAT DANDER IGE QN: <0.1 KUA/L
CMN PIGWEED IGE QN: <0.1 KUA/L
COMMON RAGWEED IGE QN: <0.1 KUA/L
COTTONWOOD IGE QN: <0.1 KUA/L
D FARINAE IGE QN: <0.1 KUA/L
D PTERONYSS IGE QN: <0.1 KUA/L
DEPRECATED A ALTERNATA IGE RAST QL: 0
DEPRECATED A FUMIGATUS IGE RAST QL: 0
DEPRECATED BERMUDA GRASS IGE RAST QL: 0
DEPRECATED BOXELDER IGE RAST QL: 0
DEPRECATED C HERBARUM IGE RAST QL: 0
DEPRECATED CAT DANDER IGE RAST QL: 0
DEPRECATED COMMON PIGWEED IGE RAST QL: 0
DEPRECATED COMMON RAGWEED IGE RAST QL: 0
DEPRECATED COTTONWOOD IGE RAST QL: 0
DEPRECATED D FARINAE IGE RAST QL: 0
DEPRECATED D PTERONYSS IGE RAST QL: 0
DEPRECATED DOG DANDER IGE RAST QL: 0
DEPRECATED GOOSEFOOT IGE RAST QL: 0
DEPRECATED LONDON PLANE IGE RAST QL: 0
DEPRECATED MOUSE URINE PROT IGE RAST QL: 0
DEPRECATED MUGWORT IGE RAST QL: 0
DEPRECATED P NOTATUM IGE RAST QL: 0
DEPRECATED RED CEDAR IGE RAST QL: 0
DEPRECATED ROACH IGE RAST QL: 0
DEPRECATED SHEEP SORREL IGE RAST QL: 0
DEPRECATED SILVER BIRCH IGE RAST QL: 0
DEPRECATED TIMOTHY IGE RAST QL: 0
DEPRECATED WHITE ASH IGE RAST QL: 0
DEPRECATED WHITE OAK IGE RAST QL: 0
DOG DANDER IGE QN: <0.1 KUA/L
EOSINOPHIL # BLD AUTO: 0.3 K/UL
EOSINOPHIL NFR BLD AUTO: 4.3 %
GOOSEFOOT IGE QN: <0.1 KUA/L
HCT VFR BLD CALC: 39.4 %
HGB BLD-MCNC: 12.3 G/DL
IMM GRANULOCYTES NFR BLD AUTO: 0.4 %
LONDON PLANE IGE QN: <0.1 KUA/L
LYMPHOCYTES # BLD AUTO: 1.93 K/UL
LYMPHOCYTES NFR BLD AUTO: 27.8 %
MAN DIFF?: NORMAL
MCHC RBC-ENTMCNC: 20.2 PG
MCHC RBC-ENTMCNC: 31.2 G/DL
MCV RBC AUTO: 64.8 FL
MONOCYTES # BLD AUTO: 0.88 K/UL
MONOCYTES NFR BLD AUTO: 12.7 %
MOUSE URINE PROT IGE QN: <0.1 KUA/L
MUGWORT IGE QN: <0.1 KUA/L
MULBERRY (T70) CLASS: 0
MULBERRY (T70) CONC: <0.1 KUA/L
NEUTROPHILS # BLD AUTO: 3.74 K/UL
NEUTROPHILS NFR BLD AUTO: 54.1 %
P NOTATUM IGE QN: <0.1 KUA/L
PLATELET # BLD AUTO: 268 K/UL
RBC # BLD: 6.08 M/UL
RBC # FLD: 20.6 %
RED CEDAR IGE QN: <0.1 KUA/L
ROACH IGE QN: <0.1 KUA/L
SHEEP SORREL IGE QN: <0.1 KUA/L
SILVER BIRCH IGE QN: <0.1 KUA/L
TIMOTHY IGE QN: <0.1 KUA/L
TOTAL IGE SMQN RAST: 111 KU/L
TREE ALLERG MIX1 IGE QL: 0
WBC # FLD AUTO: 6.93 K/UL
WHITE ASH IGE QN: <0.1 KUA/L
WHITE ELM IGE QN: 0
WHITE ELM IGE QN: <0.1 KUA/L
WHITE OAK IGE QN: <0.1 KUA/L

## 2025-04-30 ENCOUNTER — NON-APPOINTMENT (OUTPATIENT)
Age: 75
End: 2025-04-30

## 2025-05-02 ENCOUNTER — TRANSCRIPTION ENCOUNTER (OUTPATIENT)
Age: 75
End: 2025-05-02

## 2025-05-06 ENCOUNTER — OUTPATIENT (OUTPATIENT)
Dept: OUTPATIENT SERVICES | Facility: HOSPITAL | Age: 75
LOS: 1 days | End: 2025-05-06
Payer: MEDICARE

## 2025-05-06 ENCOUNTER — APPOINTMENT (OUTPATIENT)
Dept: CT IMAGING | Facility: CLINIC | Age: 75
End: 2025-05-06

## 2025-05-06 DIAGNOSIS — R91.1 SOLITARY PULMONARY NODULE: ICD-10-CM

## 2025-05-06 DIAGNOSIS — R06.02 SHORTNESS OF BREATH: ICD-10-CM

## 2025-05-06 DIAGNOSIS — Z95.5 PRESENCE OF CORONARY ANGIOPLASTY IMPLANT AND GRAFT: Chronic | ICD-10-CM

## 2025-05-06 DIAGNOSIS — Z98.890 OTHER SPECIFIED POSTPROCEDURAL STATES: Chronic | ICD-10-CM

## 2025-05-06 PROCEDURE — 71250 CT THORAX DX C-: CPT

## 2025-05-06 PROCEDURE — 71250 CT THORAX DX C-: CPT | Mod: 26

## 2025-05-14 DIAGNOSIS — R91.1 SOLITARY PULMONARY NODULE: ICD-10-CM

## 2025-05-16 ENCOUNTER — APPOINTMENT (OUTPATIENT)
Dept: NUCLEAR MEDICINE | Facility: CLINIC | Age: 75
End: 2025-05-16
Payer: MEDICARE

## 2025-05-16 ENCOUNTER — OUTPATIENT (OUTPATIENT)
Dept: OUTPATIENT SERVICES | Facility: HOSPITAL | Age: 75
LOS: 1 days | End: 2025-05-16

## 2025-05-16 DIAGNOSIS — R91.1 SOLITARY PULMONARY NODULE: ICD-10-CM

## 2025-05-16 DIAGNOSIS — Z95.5 PRESENCE OF CORONARY ANGIOPLASTY IMPLANT AND GRAFT: Chronic | ICD-10-CM

## 2025-05-16 DIAGNOSIS — Z98.890 OTHER SPECIFIED POSTPROCEDURAL STATES: Chronic | ICD-10-CM

## 2025-05-16 PROCEDURE — 78815 PET IMAGE W/CT SKULL-THIGH: CPT | Mod: 26,PI

## 2025-05-21 ENCOUNTER — APPOINTMENT (OUTPATIENT)
Dept: PULMONOLOGY | Facility: CLINIC | Age: 75
End: 2025-05-21
Payer: MEDICARE

## 2025-05-21 VITALS — HEIGHT: 65 IN | BODY MASS INDEX: 22.49 KG/M2 | WEIGHT: 135 LBS

## 2025-05-21 VITALS
HEART RATE: 90 BPM | SYSTOLIC BLOOD PRESSURE: 112 MMHG | OXYGEN SATURATION: 98 % | DIASTOLIC BLOOD PRESSURE: 70 MMHG | RESPIRATION RATE: 16 BRPM

## 2025-05-21 PROBLEM — J45.909 ASTHMA, UNSPECIFIED ASTHMA SEVERITY, UNSPECIFIED WHETHER COMPLICATED, UNSPECIFIED WHETHER PERSISTENT: Status: ACTIVE | Noted: 2025-05-21

## 2025-05-21 PROCEDURE — 94010 BREATHING CAPACITY TEST: CPT

## 2025-05-21 PROCEDURE — 99214 OFFICE O/P EST MOD 30 MIN: CPT | Mod: 25

## 2025-05-21 PROCEDURE — 94729 DIFFUSING CAPACITY: CPT

## 2025-05-21 PROCEDURE — 94727 GAS DIL/WSHOT DETER LNG VOL: CPT

## 2025-05-21 PROCEDURE — 85018 HEMOGLOBIN: CPT | Mod: QW

## 2025-05-21 RX ORDER — ALBUTEROL SULFATE 90 UG/1
108 (90 BASE) INHALANT RESPIRATORY (INHALATION)
Qty: 1 | Refills: 5 | Status: ACTIVE | COMMUNITY
Start: 2025-05-21 | End: 1900-01-01

## 2025-05-21 RX ORDER — FLUTICASONE FUROATE AND VILANTEROL TRIFENATATE 200; 25 UG/1; UG/1
200-25 POWDER RESPIRATORY (INHALATION)
Qty: 1 | Refills: 5 | Status: ACTIVE | COMMUNITY
Start: 2025-05-21 | End: 1900-01-01

## 2025-05-28 ENCOUNTER — APPOINTMENT (OUTPATIENT)
Dept: CARDIOLOGY | Facility: CLINIC | Age: 75
End: 2025-05-28
Payer: MEDICARE

## 2025-05-28 ENCOUNTER — NON-APPOINTMENT (OUTPATIENT)
Age: 75
End: 2025-05-28

## 2025-05-28 VITALS
BODY MASS INDEX: 23.16 KG/M2 | DIASTOLIC BLOOD PRESSURE: 63 MMHG | HEART RATE: 82 BPM | WEIGHT: 139 LBS | SYSTOLIC BLOOD PRESSURE: 105 MMHG | OXYGEN SATURATION: 99 % | HEIGHT: 65 IN

## 2025-05-28 DIAGNOSIS — I25.10 ATHEROSCLEROTIC HEART DISEASE OF NATIVE CORONARY ARTERY W/OUT ANGINA PECTORIS: ICD-10-CM

## 2025-05-28 DIAGNOSIS — J45.909 UNSPECIFIED ASTHMA, UNCOMPLICATED: ICD-10-CM

## 2025-05-28 DIAGNOSIS — Z95.5 PRESENCE OF CORONARY ANGIOPLASTY IMPLANT AND GRAFT: ICD-10-CM

## 2025-05-28 DIAGNOSIS — R06.02 SHORTNESS OF BREATH: ICD-10-CM

## 2025-05-28 PROCEDURE — G2211 COMPLEX E/M VISIT ADD ON: CPT

## 2025-05-28 PROCEDURE — 93000 ELECTROCARDIOGRAM COMPLETE: CPT

## 2025-05-28 PROCEDURE — 99214 OFFICE O/P EST MOD 30 MIN: CPT

## 2025-07-28 ENCOUNTER — APPOINTMENT (OUTPATIENT)
Dept: PULMONOLOGY | Facility: CLINIC | Age: 75
End: 2025-07-28
Payer: MEDICARE

## 2025-07-28 VITALS
OXYGEN SATURATION: 98 % | SYSTOLIC BLOOD PRESSURE: 106 MMHG | DIASTOLIC BLOOD PRESSURE: 62 MMHG | BODY MASS INDEX: 22.82 KG/M2 | HEART RATE: 92 BPM | RESPIRATION RATE: 16 BRPM | HEIGHT: 65 IN | WEIGHT: 137 LBS

## 2025-07-28 PROCEDURE — 99214 OFFICE O/P EST MOD 30 MIN: CPT

## 2025-07-28 PROCEDURE — G2211 COMPLEX E/M VISIT ADD ON: CPT
